# Patient Record
Sex: FEMALE | Race: OTHER | NOT HISPANIC OR LATINO | URBAN - METROPOLITAN AREA
[De-identification: names, ages, dates, MRNs, and addresses within clinical notes are randomized per-mention and may not be internally consistent; named-entity substitution may affect disease eponyms.]

---

## 2017-12-13 ENCOUNTER — OUTPATIENT (OUTPATIENT)
Dept: OUTPATIENT SERVICES | Facility: HOSPITAL | Age: 27
LOS: 1 days | End: 2017-12-13
Payer: COMMERCIAL

## 2017-12-13 VITALS
RESPIRATION RATE: 16 BRPM | SYSTOLIC BLOOD PRESSURE: 130 MMHG | TEMPERATURE: 98 F | DIASTOLIC BLOOD PRESSURE: 78 MMHG | WEIGHT: 179.02 LBS | HEART RATE: 80 BPM | HEIGHT: 62 IN

## 2017-12-13 DIAGNOSIS — Z01.818 ENCOUNTER FOR OTHER PREPROCEDURAL EXAMINATION: ICD-10-CM

## 2017-12-13 DIAGNOSIS — N83.202 UNSPECIFIED OVARIAN CYST, LEFT SIDE: ICD-10-CM

## 2017-12-13 DIAGNOSIS — N83.209 UNSPECIFIED OVARIAN CYST, UNSPECIFIED SIDE: ICD-10-CM

## 2017-12-13 LAB
ANION GAP SERPL CALC-SCNC: 8 MMOL/L — SIGNIFICANT CHANGE UP (ref 5–17)
BUN SERPL-MCNC: 12 MG/DL — SIGNIFICANT CHANGE UP (ref 7–23)
CALCIUM SERPL-MCNC: 9.3 MG/DL — SIGNIFICANT CHANGE UP (ref 8.5–10.1)
CHLORIDE SERPL-SCNC: 106 MMOL/L — SIGNIFICANT CHANGE UP (ref 96–108)
CO2 SERPL-SCNC: 26 MMOL/L — SIGNIFICANT CHANGE UP (ref 22–31)
CREAT SERPL-MCNC: 0.59 MG/DL — SIGNIFICANT CHANGE UP (ref 0.5–1.3)
GLUCOSE SERPL-MCNC: 83 MG/DL — SIGNIFICANT CHANGE UP (ref 70–99)
HCG SERPL-ACNC: <1 MIU/ML — SIGNIFICANT CHANGE UP
HCT VFR BLD CALC: 44.7 % — SIGNIFICANT CHANGE UP (ref 34.5–45)
HGB BLD-MCNC: 14.4 G/DL — SIGNIFICANT CHANGE UP (ref 11.5–15.5)
HIV 1+2 AB+HIV1 P24 AG SERPL QL IA: SIGNIFICANT CHANGE UP
MCHC RBC-ENTMCNC: 29.3 PG — SIGNIFICANT CHANGE UP (ref 27–34)
MCHC RBC-ENTMCNC: 32.1 GM/DL — SIGNIFICANT CHANGE UP (ref 32–36)
MCV RBC AUTO: 91.2 FL — SIGNIFICANT CHANGE UP (ref 80–100)
PLATELET # BLD AUTO: 346 K/UL — SIGNIFICANT CHANGE UP (ref 150–400)
POTASSIUM SERPL-MCNC: 3.6 MMOL/L — SIGNIFICANT CHANGE UP (ref 3.5–5.3)
POTASSIUM SERPL-SCNC: 3.6 MMOL/L — SIGNIFICANT CHANGE UP (ref 3.5–5.3)
RBC # BLD: 4.9 M/UL — SIGNIFICANT CHANGE UP (ref 3.8–5.2)
RBC # FLD: 12 % — SIGNIFICANT CHANGE UP (ref 10.3–14.5)
SODIUM SERPL-SCNC: 140 MMOL/L — SIGNIFICANT CHANGE UP (ref 135–145)
WBC # BLD: 9.8 K/UL — SIGNIFICANT CHANGE UP (ref 3.8–10.5)
WBC # FLD AUTO: 9.8 K/UL — SIGNIFICANT CHANGE UP (ref 3.8–10.5)

## 2017-12-13 PROCEDURE — G0463: CPT

## 2017-12-13 PROCEDURE — 86850 RBC ANTIBODY SCREEN: CPT

## 2017-12-13 PROCEDURE — 87389 HIV-1 AG W/HIV-1&-2 AB AG IA: CPT

## 2017-12-13 PROCEDURE — 85027 COMPLETE CBC AUTOMATED: CPT

## 2017-12-13 PROCEDURE — 80048 BASIC METABOLIC PNL TOTAL CA: CPT

## 2017-12-13 PROCEDURE — 84702 CHORIONIC GONADOTROPIN TEST: CPT

## 2017-12-13 PROCEDURE — 86901 BLOOD TYPING SEROLOGIC RH(D): CPT

## 2017-12-13 PROCEDURE — 86900 BLOOD TYPING SEROLOGIC ABO: CPT

## 2017-12-13 PROCEDURE — 36415 COLL VENOUS BLD VENIPUNCTURE: CPT

## 2017-12-13 NOTE — H&P PST ADULT - HISTORY OF PRESENT ILLNESS
28 y/o healthy female went for regular check up with GYN doctor. Sonogram was done , diagnosed with ovarian cyst about 6 months ago. Was started on birth control pills 6 months ago, was having headaches and irregular periods, so stopped. Went back for follow up about the cyst, was told that it has grown.  Now scheduled for left  laparoscopic ovarian cystectomy. Pre op testing today.

## 2017-12-13 NOTE — H&P PST ADULT - ASSESSMENT
28 y/o healthy female with left ovarian cyst, scheduled for left  laparoscopic ovarian cystectomy. Pre op testing today. No medical eval necessary.

## 2017-12-21 ENCOUNTER — TRANSCRIPTION ENCOUNTER (OUTPATIENT)
Age: 27
End: 2017-12-21

## 2017-12-21 RX ORDER — HYDROMORPHONE HYDROCHLORIDE 2 MG/ML
0.5 INJECTION INTRAMUSCULAR; INTRAVENOUS; SUBCUTANEOUS ONCE
Qty: 0 | Refills: 0 | Status: DISCONTINUED | OUTPATIENT
Start: 2017-12-22 | End: 2017-12-22

## 2017-12-21 RX ORDER — SODIUM CHLORIDE 9 MG/ML
1000 INJECTION, SOLUTION INTRAVENOUS
Qty: 0 | Refills: 0 | Status: DISCONTINUED | OUTPATIENT
Start: 2017-12-22 | End: 2017-12-22

## 2017-12-22 ENCOUNTER — OUTPATIENT (OUTPATIENT)
Dept: OUTPATIENT SERVICES | Facility: HOSPITAL | Age: 27
LOS: 1 days | End: 2017-12-22
Payer: COMMERCIAL

## 2017-12-22 ENCOUNTER — RESULT REVIEW (OUTPATIENT)
Age: 27
End: 2017-12-22

## 2017-12-22 VITALS
WEIGHT: 181 LBS | SYSTOLIC BLOOD PRESSURE: 117 MMHG | HEART RATE: 73 BPM | TEMPERATURE: 98 F | DIASTOLIC BLOOD PRESSURE: 71 MMHG | OXYGEN SATURATION: 100 % | HEIGHT: 62 IN | RESPIRATION RATE: 14 BRPM

## 2017-12-22 VITALS
DIASTOLIC BLOOD PRESSURE: 77 MMHG | SYSTOLIC BLOOD PRESSURE: 116 MMHG | HEART RATE: 76 BPM | RESPIRATION RATE: 16 BRPM | OXYGEN SATURATION: 94 %

## 2017-12-22 DIAGNOSIS — N83.209 UNSPECIFIED OVARIAN CYST, UNSPECIFIED SIDE: ICD-10-CM

## 2017-12-22 LAB — HCG UR QL: NEGATIVE — SIGNIFICANT CHANGE UP

## 2017-12-22 PROCEDURE — 81025 URINE PREGNANCY TEST: CPT

## 2017-12-22 PROCEDURE — 58662 LAPAROSCOPY EXCISE LESIONS: CPT

## 2017-12-22 PROCEDURE — 88305 TISSUE EXAM BY PATHOLOGIST: CPT

## 2017-12-22 PROCEDURE — 88305 TISSUE EXAM BY PATHOLOGIST: CPT | Mod: 26

## 2017-12-22 PROCEDURE — C1889: CPT

## 2017-12-22 RX ORDER — ACETAMINOPHEN 500 MG
1000 TABLET ORAL ONCE
Qty: 0 | Refills: 0 | Status: COMPLETED | OUTPATIENT
Start: 2017-12-22 | End: 2017-12-22

## 2017-12-22 RX ORDER — OXYCODONE HYDROCHLORIDE 5 MG/1
10 TABLET ORAL ONCE
Qty: 0 | Refills: 0 | Status: DISCONTINUED | OUTPATIENT
Start: 2017-12-22 | End: 2017-12-22

## 2017-12-22 RX ADMIN — Medication 200 MILLIGRAM(S): at 11:54

## 2017-12-22 RX ADMIN — SODIUM CHLORIDE 100 MILLILITER(S): 9 INJECTION, SOLUTION INTRAVENOUS at 11:54

## 2017-12-22 RX ADMIN — HYDROMORPHONE HYDROCHLORIDE 0.5 MILLIGRAM(S): 2 INJECTION INTRAMUSCULAR; INTRAVENOUS; SUBCUTANEOUS at 12:05

## 2017-12-22 RX ADMIN — HYDROMORPHONE HYDROCHLORIDE 0.5 MILLIGRAM(S): 2 INJECTION INTRAMUSCULAR; INTRAVENOUS; SUBCUTANEOUS at 11:53

## 2017-12-22 RX ADMIN — SODIUM CHLORIDE 40 MILLILITER(S): 9 INJECTION, SOLUTION INTRAVENOUS at 07:46

## 2017-12-22 NOTE — ASU DISCHARGE PLAN (ADULT/PEDIATRIC). - =======================================================================
10/9/2017 10:41 AM 
 
Patient: Varsha YOB: 1952 Date of Visit:    10/9/2017 Dear MD Zulay 
 
Thank you for referring Ms. Maddox to me for evaluation/treatment. Below are the relevant portions of my assessment and plan of care. Neurology Consultation Note REFERRED BY: 
MD Zulay 
 
10/09/17 Chief Complaint Patient presents with  New Patient  
  gridele muscle weakness/elevated CK HISTORY OF PRESENT ILLNESS Varsha is a 76 y.o. female who presented to the neurology office for management of progressive weakness in proximal muscles of the lower extremities. The patient states that she used to live on the third floor around 3 years ago but started noticing some weakness 2 years ago and in the last 1 year it has gotten even more significant. It has been gradually progressing. She does not have any problems walking straight on a level floor but when she has to go up the stairs she starts having strain in her lower back and she has to pull herself up using the handrail. She notices weakness in her lower extremities as well. She denies any history of diabetes and there is no associated numbness in the lower extremities. She did have muscle enzymes and they were marginally elevated. Current Outpatient Prescriptions Medication Sig  escitalopram oxalate (LEXAPRO) 10 mg tablet Take 10 mg by mouth daily.  losartan (COZAAR) 100 mg tablet Take 100 mg by mouth daily.  valACYclovir (VALTREX) 1 gram tablet daily.  zolpidem (AMBIEN) 10 mg tablet Take 10 mg by mouth once.  estradiol (ESTRACE) 0.5 mg tablet Take  by mouth daily.  medroxyPROGESTERone (PROVERA) 2.5 mg tablet Take 2.5 mg by mouth daily.  cyanocobalamin, vitamin B-12, 3,000 mcg lozg by SubLINGual route.  cranberry extract 450 mg tab tablet Take 450 mg by mouth two (2) times a day. No current facility-administered medications for this visit. Allergies Allergen Reactions  Sulfa (Sulfonamide Antibiotics) Itching Past Medical History:  
Diagnosis Date  Herpes labialis  History of recurrent UTIs   
 sex related  Hormone replacement therapy  HTN (hypertension)  RBBB History reviewed. No pertinent surgical history. Family History Problem Relation Age of Onset  Cancer Mother   
  colon Social History Substance Use Topics  Smoking status: Never Smoker  Smokeless tobacco: Never Used  Alcohol use Yes REVIEW OF SYSTEMS:  
A ten system review of constitutional, cardiovascular, respiratory, musculoskeletal, endocrine, skin, SHEENT, genitourinary, psychiatric and neurologic systems was obtained and is unremarkable with the exception of depression, joint pain, muscle pain, muscle weakness, neuropathy, snoring and vertigo EXAMINATION:  
Visit Vitals  /80  Ht 5' 2\" (1.575 m)  Wt 144 lb 9.6 oz (65.6 kg)  SpO2 98%  BMI 26.45 kg/m2 General:  
General appearance: Pt is in no acute distress Distal pulses are preserved Fundoscopic Exam: Normal 
 
Neurological Examination:  
Mental Status: AAO x3. Speech is fluent. Follows commands, has normal fund of knowledge, attention, short term recall, comprehension and insight. Cranial Nerves: Visual fields are full. PERRL, Extraocular movements are full. Facial sensation intact V1- V3. Facial movement intact, symmetric. Hearing intact to conversation. Palate elevates symmetrically. Shoulder shrug symmetric. Tongue midline. Motor: Strength is 5/5 in all 4 ext. No atrophy. Tone: Normal 
 
Sensation: Normal to light touch Reflexes: DTRs 2+ everywhere except absent at ankles. Plantar responses downgoing. Coordination/Cerebellar: Intact to finger-nose-finger Gait: Casual gait is normal.  
 
Skin: No significant bruising or lacerations.  
 
I also examined her while going up the stairs and she had to make additional effort to go up the stairs and was holding onto her lower back. Laboratory review:  
Results for orders placed or performed in visit on 08/11/17 CK Result Value Ref Range Creatine Kinase,Total 289 (H) 24 - 173 U/L ALDOLASE Result Value Ref Range Aldolase 4.7 3.3 - 10.3 U/L  
SED RATE (ESR) Result Value Ref Range Sed rate (ESR) 3 0 - 40 mm/hr Imaging review: 
8/9/2017 Mammogram 
The left implant has new extracapsular silicone. The left implant shows extracapsular rupture. No specific mammographic evidence of malignancy. Documentation review: I reviewed the notes from the primary care physician from 8/11/2017. The patient does have hypertension. The patient is also complaining of lower extremity weakness. The patient has chronic UTIs. The patient is having trouble swallowing at times. We will do blood work and a 3D mammogram and a colonoscopy. We will start Keflex for recurrent UTI as needed. The patient is having pelvic girdle weakness and has problems going up the stairs. Will get a CK level. And will refer the patient to physical therapy. Assessment/Plan: Jaquan Moreno is a 76 y.o. female who presented to the neurology office for management of weakness in the proximal muscles of the lower extremities. On examination I did not find any appreciable weakness in the proximal lower extremities. I also took her on the stairs and she was holding onto her back to go up the stairs. She might have mild weakness that is not appreciable on formal testing. I will get MRI of the lumbar spine and an EMG/nerve conduction study to evaluate this further. Also, I have ordered myasthenia panel and will be repeating the CK level. If this workup is negative, we will proceed with proximal lower extremity muscle biopsy to look for any dystrophy/myopathy. ICD-10-CM ICD-9-CM 1. Proximal limb muscle weakness M62.89 728.87 MRI LUMB SPINE WO CONT MYASTHENIA GRAVIS EVALUATION  
   CK Thank you for allowing me to participate in the care of Ms. Weiner. Please feel free to contact me if you have any questions. Ronell Bumpers, MD 
Neurologist 
 
CC: Hussain Pereyra MD 
Fax: [phone] This note was created using voice recognition software. Despite editing, there may be syntax errors. This note will not be viewable in [address] If you have questions, please do not hesitate to call me. I look forward to following Ms. Weiner along with you. Sincerely, Ronell Bumpers, MD 
 
 
 
 Statement Selected

## 2017-12-22 NOTE — BRIEF OPERATIVE NOTE - PROCEDURE
<<-----Click on this checkbox to enter Procedure Laparoscopic left ovarian cystectomy  12/22/2017    Active  AMBER

## 2017-12-22 NOTE — ASU DISCHARGE PLAN (ADULT/PEDIATRIC). - NOTIFY
Pain not relieved by Medications/GYN Fever>100.4/Bleeding that does not stop/Fever greater than 101/Unable to Urinate/Persistent Nausea and Vomiting

## 2017-12-22 NOTE — ASU DISCHARGE PLAN (ADULT/PEDIATRIC). - INSTRUCTIONS
****Call the office with any problems including but not limited to heavy vaginal bleeding, fevers, severe abdominal pain, inability to eat/drink/urinate  **** Nothing in the vagina x2 weeks-( No sex, tampons, douching )  *****You may shower as usual but no hot tubs, bath tubs, swimming pools x2 weeks. Advance diet as tolerated

## 2017-12-22 NOTE — ASU DISCHARGE PLAN (ADULT/PEDIATRIC). - MEDICATION SUMMARY - MEDICATIONS TO TAKE
I will START or STAY ON the medications listed below when I get home from the hospital:    Percocet 5/325 oral tablet  -- 1 tab(s) by mouth every 6 hours, As Needed MDD:4   -- Caution federal law prohibits the transfer of this drug to any person other  than the person for whom it was prescribed.  May cause drowsiness.  Alcohol may intensify this effect.  Use care when operating dangerous machinery.  This prescription cannot be refilled.  This product contains acetaminophen.  Do not use  with any other product containing acetaminophen to prevent possible liver damage.  Using more of this medication than prescribed may cause serious breathing problems.    -- Indication: For pain med    Motrin 600 mg oral tablet  -- 1 tab(s) by mouth every 8 hours x 3 days   -- Indication: For pain med

## 2017-12-22 NOTE — ASU DISCHARGE PLAN (ADULT/PEDIATRIC). - ACTIVITY LEVEL
no exercise/no douching/no tampons/no sports/gym/nothing per vagina/no intercourse/no tub baths/no heavy lifting

## 2017-12-22 NOTE — BRIEF OPERATIVE NOTE - OPERATION/FINDINGS
EUA: Normal sized anteverted uterus. Left adnexal fullness. Laparoscopy: Left ovarian cyst, ruptured, hair and fat noted. Normal appearing right ovary, and bilateral tubes.

## 2020-03-09 ENCOUNTER — RESULT REVIEW (OUTPATIENT)
Age: 30
End: 2020-03-09

## 2020-03-09 ENCOUNTER — OUTPATIENT (OUTPATIENT)
Dept: OUTPATIENT SERVICES | Facility: HOSPITAL | Age: 30
LOS: 1 days | End: 2020-03-09
Payer: COMMERCIAL

## 2020-03-09 VITALS
TEMPERATURE: 98 F | OXYGEN SATURATION: 100 % | WEIGHT: 190.04 LBS | SYSTOLIC BLOOD PRESSURE: 127 MMHG | DIASTOLIC BLOOD PRESSURE: 69 MMHG | HEART RATE: 72 BPM | HEIGHT: 63 IN | RESPIRATION RATE: 14 BRPM

## 2020-03-09 DIAGNOSIS — Z01.818 ENCOUNTER FOR OTHER PREPROCEDURAL EXAMINATION: ICD-10-CM

## 2020-03-09 DIAGNOSIS — N87.1 MODERATE CERVICAL DYSPLASIA: ICD-10-CM

## 2020-03-09 DIAGNOSIS — Z98.890 OTHER SPECIFIED POSTPROCEDURAL STATES: Chronic | ICD-10-CM

## 2020-03-09 LAB
HCG SERPL-ACNC: <1 MIU/ML — SIGNIFICANT CHANGE UP
HCT VFR BLD CALC: 42.3 % — SIGNIFICANT CHANGE UP (ref 34.5–45)
HGB BLD-MCNC: 13.7 G/DL — SIGNIFICANT CHANGE UP (ref 11.5–15.5)
HIV 1+2 AB+HIV1 P24 AG SERPL QL IA: SIGNIFICANT CHANGE UP
MCHC RBC-ENTMCNC: 29 PG — SIGNIFICANT CHANGE UP (ref 27–34)
MCHC RBC-ENTMCNC: 32.4 GM/DL — SIGNIFICANT CHANGE UP (ref 32–36)
MCV RBC AUTO: 89.6 FL — SIGNIFICANT CHANGE UP (ref 80–100)
NRBC # BLD: 0 /100 WBCS — SIGNIFICANT CHANGE UP (ref 0–0)
PLATELET # BLD AUTO: 329 K/UL — SIGNIFICANT CHANGE UP (ref 150–400)
RBC # BLD: 4.72 M/UL — SIGNIFICANT CHANGE UP (ref 3.8–5.2)
RBC # FLD: 13.2 % — SIGNIFICANT CHANGE UP (ref 10.3–14.5)
WBC # BLD: 9.38 K/UL — SIGNIFICANT CHANGE UP (ref 3.8–10.5)
WBC # FLD AUTO: 9.38 K/UL — SIGNIFICANT CHANGE UP (ref 3.8–10.5)

## 2020-03-09 PROCEDURE — 85027 COMPLETE CBC AUTOMATED: CPT

## 2020-03-09 PROCEDURE — 84702 CHORIONIC GONADOTROPIN TEST: CPT

## 2020-03-09 PROCEDURE — 88321 CONSLTJ&REPRT SLD PREP ELSWR: CPT

## 2020-03-09 PROCEDURE — 36415 COLL VENOUS BLD VENIPUNCTURE: CPT

## 2020-03-09 PROCEDURE — G0463: CPT

## 2020-03-09 PROCEDURE — 87389 HIV-1 AG W/HIV-1&-2 AB AG IA: CPT

## 2020-03-09 RX ORDER — IBUPROFEN 200 MG
1 TABLET ORAL
Qty: 0 | Refills: 0 | DISCHARGE

## 2020-03-09 NOTE — H&P PST ADULT - NSANTHOSAYNRD_GEN_A_CORE
No. ARLEN screening performed.  STOP BANG Legend: 0-2 = LOW Risk; 3-4 = INTERMEDIATE Risk; 5-8 = HIGH Risk

## 2020-03-09 NOTE — H&P PST ADULT - NSICDXPROBLEM_GEN_ALL_CORE_FT
PROBLEM DIAGNOSES  Problem: Moderate cervical dysplasia  Assessment and Plan: PST Labs; CBC,  HcG - No medical Clearance needed - pt instructed to stop any NSAIDS/Herbal Supplements her Probiotic between now and procedure - pt may take Tylenol if needed for pain between now and procedure - pre-op instructions given to pt with understanding verbalized

## 2020-03-09 NOTE — H&P PST ADULT - NSICDXFAMILYHX_GEN_ALL_CORE_FT
FAMILY HISTORY:  Mother  Still living? Yes, Estimated age: 41-50  Hypertension, Age at diagnosis: Age Unknown

## 2020-03-09 NOTE — H&P PST ADULT - NEGATIVE ENMT SYMPTOMS
no vertigo/no sinus symptoms/no throat pain/no dysphagia/no hearing difficulty/no nasal congestion/no tinnitus/no post-nasal discharge

## 2020-03-09 NOTE — H&P PST ADULT - NSICDXPASTSURGICALHX_GEN_ALL_CORE_FT
PAST SURGICAL HISTORY:  H/O eye surgery RIGHT Eye Surgery August 2019    H/O ovarian cystectomy 2017

## 2020-03-09 NOTE — H&P PST ADULT - ASSESSMENT
29 year old female with Moderate Cervical Dysplasia - Scheduled for LEEP procedure - Endocervical Curettage on 3/13/2020 with Dr Aguero-

## 2020-03-09 NOTE — H&P PST ADULT - NSICDXPASTMEDICALHX_GEN_ALL_CORE_FT
PAST MEDICAL HISTORY:  History of abnormal Pap smear     History of HPV infection     History of ovarian cyst     Moderate cervical dysplasia

## 2020-03-09 NOTE — H&P PST ADULT - ENMT COMMENTS
Notes "Occasional Ear -Popping" - notes has been checked by ENT and they said " nothing to worry about - got hearing checked and it was fine"

## 2020-03-09 NOTE — H&P PST ADULT - HISTORY OF PRESENT ILLNESS
29 year old female G0 Presents for PST prior to  LEEP procedure - Endocervical Curettage with Dr Aguero on 3//13/2020 - Pt notes prior H/O Abnormal Pap Smear however notes "there has been a change with most recent one." Pt notes following discussions with Dr Aguero she is electing for scheduled procedure.

## 2020-03-12 ENCOUNTER — TRANSCRIPTION ENCOUNTER (OUTPATIENT)
Age: 30
End: 2020-03-12

## 2020-03-12 RX ORDER — OXYCODONE HYDROCHLORIDE 5 MG/1
5 TABLET ORAL ONCE
Refills: 0 | Status: DISCONTINUED | OUTPATIENT
Start: 2020-03-13 | End: 2020-03-13

## 2020-03-12 RX ORDER — METOCLOPRAMIDE HCL 10 MG
5 TABLET ORAL ONCE
Refills: 0 | Status: DISCONTINUED | OUTPATIENT
Start: 2020-03-13 | End: 2020-03-28

## 2020-03-12 RX ORDER — ACETAMINOPHEN 500 MG
975 TABLET ORAL ONCE
Refills: 0 | Status: COMPLETED | OUTPATIENT
Start: 2020-03-13 | End: 2020-03-13

## 2020-03-12 RX ORDER — SODIUM CHLORIDE 9 MG/ML
1000 INJECTION, SOLUTION INTRAVENOUS
Refills: 0 | Status: DISCONTINUED | OUTPATIENT
Start: 2020-03-13 | End: 2020-03-13

## 2020-03-12 RX ORDER — HYDROMORPHONE HYDROCHLORIDE 2 MG/ML
0.5 INJECTION INTRAMUSCULAR; INTRAVENOUS; SUBCUTANEOUS
Refills: 0 | Status: DISCONTINUED | OUTPATIENT
Start: 2020-03-13 | End: 2020-03-13

## 2020-03-12 RX ORDER — SODIUM CHLORIDE 9 MG/ML
1000 INJECTION, SOLUTION INTRAVENOUS
Refills: 0 | Status: DISCONTINUED | OUTPATIENT
Start: 2020-03-13 | End: 2020-03-28

## 2020-03-12 NOTE — ASU PATIENT PROFILE, ADULT - PMH
History of abnormal Pap smear    History of HPV infection    History of ovarian cyst    Moderate cervical dysplasia

## 2020-03-13 ENCOUNTER — OUTPATIENT (OUTPATIENT)
Dept: OUTPATIENT SERVICES | Facility: HOSPITAL | Age: 30
LOS: 1 days | End: 2020-03-13
Payer: COMMERCIAL

## 2020-03-13 ENCOUNTER — RESULT REVIEW (OUTPATIENT)
Age: 30
End: 2020-03-13

## 2020-03-13 VITALS
RESPIRATION RATE: 16 BRPM | WEIGHT: 188.05 LBS | HEIGHT: 63 IN | SYSTOLIC BLOOD PRESSURE: 108 MMHG | HEART RATE: 73 BPM | TEMPERATURE: 98 F | DIASTOLIC BLOOD PRESSURE: 69 MMHG | OXYGEN SATURATION: 99 %

## 2020-03-13 VITALS
SYSTOLIC BLOOD PRESSURE: 116 MMHG | DIASTOLIC BLOOD PRESSURE: 76 MMHG | HEART RATE: 79 BPM | RESPIRATION RATE: 15 BRPM | OXYGEN SATURATION: 99 %

## 2020-03-13 DIAGNOSIS — N87.1 MODERATE CERVICAL DYSPLASIA: ICD-10-CM

## 2020-03-13 DIAGNOSIS — Z01.818 ENCOUNTER FOR OTHER PREPROCEDURAL EXAMINATION: ICD-10-CM

## 2020-03-13 DIAGNOSIS — Z98.890 OTHER SPECIFIED POSTPROCEDURAL STATES: Chronic | ICD-10-CM

## 2020-03-13 PROCEDURE — 88305 TISSUE EXAM BY PATHOLOGIST: CPT

## 2020-03-13 PROCEDURE — 88305 TISSUE EXAM BY PATHOLOGIST: CPT | Mod: 26

## 2020-03-13 PROCEDURE — 88307 TISSUE EXAM BY PATHOLOGIST: CPT | Mod: 26

## 2020-03-13 PROCEDURE — 57522 CONIZATION OF CERVIX: CPT

## 2020-03-13 PROCEDURE — 88307 TISSUE EXAM BY PATHOLOGIST: CPT

## 2020-03-13 RX ADMIN — SODIUM CHLORIDE 75 MILLILITER(S): 9 INJECTION, SOLUTION INTRAVENOUS at 14:04

## 2020-03-13 RX ADMIN — Medication 975 MILLIGRAM(S): at 14:04

## 2020-03-13 RX ADMIN — SODIUM CHLORIDE 120 MILLILITER(S): 9 INJECTION, SOLUTION INTRAVENOUS at 16:46

## 2020-03-13 NOTE — BRIEF OPERATIVE NOTE - NSICDXBRIEFPROCEDURE_GEN_ALL_CORE_FT
PROCEDURES:  Endocervical curettage 13-Mar-2020 16:27:03  Mary Beth Cesar  LEEP, cervix 13-Mar-2020 16:26:50  Mary Beth Cesar

## 2020-03-13 NOTE — ASU DISCHARGE PLAN (ADULT/PEDIATRIC) - FOLLOW UP APPOINTMENTS
911 or go to the nearest Emergency Room 528 or go to the nearest Emergency Room/[facility] [phone]

## 2020-03-13 NOTE — ASU DISCHARGE PLAN (ADULT/PEDIATRIC) - CALL YOUR DOCTOR IF YOU HAVE ANY OF THE FOLLOWING:
Nausea and vomiting that does not stop/Pain not relieved by Medications/Bleeding that does not stop/Wound/Surgical Site with redness, or foul smelling discharge or pus/Unable to urinate/Numbness, tingling, color or temperature change to extremity/Fever greater than (need to indicate Fahrenheit or Celsius)/Swelling that gets worse

## 2020-03-13 NOTE — ASU DISCHARGE PLAN (ADULT/PEDIATRIC) - ACTIVITY LEVEL
No tub baths/No heavy lifting/No tampons/No douching/No sports/gym/Quiet play/Nothing per rectum/Nothing per vagina/No intercourse

## 2020-03-13 NOTE — ASU DISCHARGE PLAN (ADULT/PEDIATRIC) - CARE PROVIDER_API CALL
Mary Beth Cleveland)  Obstetrics and Gynecology  [address] [facility], [address] [place] [address]  Phone: [phone]  Fax: [phone]  Established Patient  Follow Up Time:

## 2020-10-14 PROBLEM — N87.1 MODERATE CERVICAL DYSPLASIA: Chronic | Status: ACTIVE | Noted: 2020-03-09

## 2020-10-14 PROBLEM — Z87.42 PERSONAL HISTORY OF OTHER DISEASES OF THE FEMALE GENITAL TRACT: Chronic | Status: ACTIVE | Noted: 2020-03-09

## 2020-10-19 NOTE — ASU DISCHARGE PLAN (ADULT/PEDIATRIC). - DIET
"  SUBJECTIVE:       HPI: Leah Cox is a 30 year old  who presents today for consultation for AUB, referral from [facility] Rosas. Long history irregular menstrual cycles, would like to go back on birth control. LMP 9. Has previously been seen for this and work-up has included basic labs and imaging. Her menses have previously been regular while taking OCPs in her 20s. Menses are every 2 weeks to 2 months. Menses are light and heavy, depending on cycle. Has seen large clots with menses. Menses last about 14 days. Has a \"mustache\" that she shaves. No excessive acne. Has previously been told she may have an \"ovarian syndrome.\"    Considering pregnancy in the next 1-5 years but not ready yet.    Last pelvic ultrasound  and was normal. No ovarian cysts.  Labs 2019:  FSH 8.9  LH 12.2  Prolactin 6    Medical history significant for hypothyroidism on Synthroid, last TSH 3.72; tobacco use, depression    She denies vaginal discharge, dysmenorrhea, dyspareunia. She denies fevers/chills, nausea/vomiting, abdominal pain or bloating. Denies dysuria, hematuria, constipation or diarrhea.    No history HTN. History migraines without aura.    Ob Hx:     Gyn Hx: Patient's last menstrual period was 2020 (exact date).    Patient is sexually active. One male partner   Last pap was  NIL neg HPV, history LEEP  for CINII.  Next pap due    STI history denies  Using none for contraception.   STD testing offered?  Declined  Menarche 14 years old.   Family history of gyn-related malignancies: Mother had benign ovarian cysts and breast cancer x2 (dx age 19yo and 24yo). No genetic testing done.         reports that she has been smoking cigarettes. She has been smoking about 0.25 packs per day. She has never used smokeless tobacco.    Today's PHQ-2 Score:   PHQ-2 (  [employer]) 10/9/2020   Q1: Little interest or pleasure in doing things 1   Q2: Feeling down, depressed or hopeless 3   PHQ-2 Score 4 "   Q1: Little interest or pleasure in doing things Several days   Q2: Feeling down, depressed or hopeless Nearly every day   PHQ-2 Score 4     Today's PHQ-9 Score:   PHQ-9 SCORE 10/9/2020   PHQ-9 Total Score MyChart 11 (Moderate depression)   PHQ-9 Total Score 11     Today's RENAY-7 Score:   RENAY-7 SCORE 11/13/2018   Total Score 20       Problem list and histories reviewed & adjusted, as indicated.  Additional history: as documented.    Patient Active Problem List   Diagnosis     Irregular periods     Pelvic pain in female     Carpal tunnel syndrome of right wrist     Adjustment disorder with depressed mood     Excessive or frequent menstruation     Ganglion cyst of dorsum of right wrist     Metacarpal boss     Segmental dysfunction of sacral region     Bilateral low back pain with right-sided sciatica     Segmental dysfunction of lumbar region     Segmental dysfunction of thoracic region     S/P LEEP of cervix     Tobacco use disorder     Major depressive disorder, recurrent episode, moderate (H)     RENAY (generalized anxiety disorder)     Diarrhea, unspecified type     Abdominal pain, generalized     Hypothyroidism, unspecified type     Family dysfunction     Past Surgical History:   Procedure Laterality Date     LEEP TX, CERVICAL  01/23/2013    SUZAN 2 with free margins - Care Everywhere.     RELEASE CARPAL TUNNEL Right 10/5/2016    Procedure: RELEASE CARPAL TUNNEL;  Surgeon: Christian Sebastian MD;  Location:  OR      Social History     Tobacco Use     Smoking status: Current Every Day Smoker     Packs/day: 0.25     Types: Cigarettes     Smokeless tobacco: Never Used   Substance Use Topics     Alcohol use: Yes     Alcohol/week: 0.0 standard drinks     Comment: occ.      Problem (# of Occurrences) Relation (Name,Age of Onset)    Hypertension (1) Father                 FLUoxetine (PROZAC) 10 MG capsule, Take 1 capsule (10 mg) by mouth daily       ibuprofen (ADVIL/MOTRIN) 200 MG capsule, Take 400 mg by mouth every 4  hours as needed for fever       levothyroxine (SYNTHROID/LEVOTHROID) 100 MCG tablet, TAKE ONE TABLET BY MOUTH ONCE DAILY       tiZANidine (ZANAFLEX) 4 MG capsule, Take 1 capsule (4 mg) by mouth At Bedtime (Patient not taking: Reported on 10/19/2020)       varenicline (CHANTIX VALENTINA) 0.5 MG X 11 & 1 MG X 42 tablet, Take 0.5 mg tab daily for 3 days, THEN 0.5 mg tab twice daily for 4 days, THEN 1 mg twice daily. (Patient not taking: Reported on 10/19/2020)    No current facility-administered medications on file prior to visit.     Allergies   Allergen Reactions     Cyclobenzaprine      Started giving her dreams       ROS:  10 Point review of systems negative other noted above in HPI    OBJECTIVE:     /78   Pulse 84   Temp 97.4  F (36.3  C) (Temporal)   Wt 85.5 kg (188 lb 8 oz)   LMP 09/05/2020 (Exact Date)   BMI 33.39 kg/m    Body mass index is 33.39 kg/m .    GENERAL: healthy, alert and no distress  EYES: Eyes grossly normal to inspection, conjunctivae and sclerae normal  RESP: no audible wheeze, cough, or visible cyanosis.  No visible retractions or increased work of breathing.  Able to speak fully in complete sentences.  Neck: soft, no cervical adenopathy, no masses  CV: RRR, no murmurs, no extra heart sounds, 2+ peripheral pulses  Resp: CTAB, good effort without distress   NEURO: Cranial nerves grossly intact, mentation intact and speech normal  PSYCH: mentation appears normal, affect normal/bright, judgement and insight intact, normal speech and appearance well-groomed        In-Clinic Test Results:  Results for orders placed or performed in visit on 10/19/20 (from the past 24 hour(s))   Hemoglobin A1c   Result Value Ref Range    Hemoglobin A1C 4.9 0 - 5.6 %   TSH with free T4 reflex   Result Value Ref Range    TSH 3.06 0.40 - 4.00 mU/L   Lipid panel reflex to direct LDL Non-fasting   Result Value Ref Range    Cholesterol 216 (H) <200 mg/dL    Triglycerides 289 (H) <150 mg/dL    HDL Cholesterol 48 (L) >49  mg/dL    LDL Cholesterol Calculated 110 (H) <100 mg/dL    Non HDL Cholesterol 168 (H) <130 mg/dL       ASSESSMENT/PLAN:                                                      Leah Cox is a 30 year old  who presents today for consultation for AUB, referral from [facility] Ros[facility].      ICD-10-CM    1. Abnormal uterine bleeding  N93.9 TSH with free T4 reflex     norgestimate-ethinyl estradiol (ORTHO-CYCLEN) 0.25-35 MG-MCG tablet   2. Family history of malignant neoplasm of breast  Z80.3 CANCER RISK MGMT/CANCER GENETIC COUNSELING REFERRAL   3. Anovulation  N97.0 Hemoglobin A1c     Lipid panel reflex to direct LDL Non-fasting     CANCELED: Lipid panel reflex to direct LDL Fasting   4. Class 1 obesity due to excess calories without serious comorbidity with body mass index (BMI) of 33.0 to 33.9 in adult  E66.09 COMPREHENSIVE WEIGHT MANAGEMENT    Z68.33    5. Hypothyroidism, unspecified type  E03.9 TSH with free T4 reflex     AUB, likely ovulatory and diagnosis consistent with PCOS without full PCOS work-up. Symptoms and cycles previously controlled with OCPs with no significant SE. Alternative options for contraception and hormonal suppression discussed in detail, patient desires OCPs until desiring pregnancy.    Discussed at length the diagnosis and treatment plans for polycystic ovary syndrome.  Discussed PCOS as a syndrome that originates with increased insulin resistance which leads to hyperandrogenism, irregular menstruation, and polycystic appearing ovaries and impaired ovulation.  Discussed lifelong implications such as endometrial hyperplasia, lipid abnormalities, diabetes, and increased risk of metabolic syndrome.      Discussed treatment plan depend on patient's desire to conceive.  If no desire to conceive, then recommended hormonal contraception.  If desires to conceive, then recommended weight loss, regular menstruation with progestins, and possible use of metformin as insulin sensitizing  agent.  Discussed possibility of using clomid or Letrozol if difficulty conceiving, but warned of increased risk of multiple gestation. Offered to complete work-up for PCOS today versus in the future along with fertility testing. She declined testing today, requesting birth control.    Patient not yet ready for pregnancy and desired to restart OCPs. Reviewed potential side effects of OCP's including but not limited to thromboembolic events, hypertension, breakthrough bleeding, GI upset, and headaches.  Reviewed proper usage.  Reviewed use of back up contraception.     Family history breast cancer, genetic counseling referral placed today.    Class 1 obesity in the setting of PCOS. Patient struggles with weight loss and was open to referral to weight loss clinic. Referral placed.     Follow-up in 1 year, sooner if needed or desiring pregnancy.      Olivia Henry DO  [facility]   progress slowly

## 2020-10-21 ENCOUNTER — OUTPATIENT (OUTPATIENT)
Dept: OUTPATIENT SERVICES | Facility: HOSPITAL | Age: 30
LOS: 1 days | End: 2020-10-21
Payer: COMMERCIAL

## 2020-10-21 VITALS
HEART RATE: 67 BPM | WEIGHT: 192.02 LBS | HEIGHT: 63 IN | RESPIRATION RATE: 16 BRPM | OXYGEN SATURATION: 100 % | DIASTOLIC BLOOD PRESSURE: 76 MMHG | TEMPERATURE: 98 F | SYSTOLIC BLOOD PRESSURE: 109 MMHG

## 2020-10-21 DIAGNOSIS — Z98.890 OTHER SPECIFIED POSTPROCEDURAL STATES: Chronic | ICD-10-CM

## 2020-10-21 DIAGNOSIS — N84.0 POLYP OF CORPUS UTERI: ICD-10-CM

## 2020-10-21 DIAGNOSIS — Z01.818 ENCOUNTER FOR OTHER PREPROCEDURAL EXAMINATION: ICD-10-CM

## 2020-10-21 DIAGNOSIS — N83.209 UNSPECIFIED OVARIAN CYST, UNSPECIFIED SIDE: ICD-10-CM

## 2020-10-21 LAB
ALBUMIN SERPL ELPH-MCNC: 3.8 G/DL — SIGNIFICANT CHANGE UP (ref 3.3–5)
ALP SERPL-CCNC: 77 U/L — SIGNIFICANT CHANGE UP (ref 40–120)
ALT FLD-CCNC: 26 U/L — SIGNIFICANT CHANGE UP (ref 12–78)
ANION GAP SERPL CALC-SCNC: 6 MMOL/L — SIGNIFICANT CHANGE UP (ref 5–17)
AST SERPL-CCNC: 11 U/L — LOW (ref 15–37)
BILIRUB SERPL-MCNC: 0.3 MG/DL — SIGNIFICANT CHANGE UP (ref 0.2–1.2)
BUN SERPL-MCNC: 14 MG/DL — SIGNIFICANT CHANGE UP (ref 7–23)
CALCIUM SERPL-MCNC: 9.1 MG/DL — SIGNIFICANT CHANGE UP (ref 8.5–10.1)
CHLORIDE SERPL-SCNC: 109 MMOL/L — HIGH (ref 96–108)
CO2 SERPL-SCNC: 25 MMOL/L — SIGNIFICANT CHANGE UP (ref 22–31)
CREAT SERPL-MCNC: 0.67 MG/DL — SIGNIFICANT CHANGE UP (ref 0.5–1.3)
GLUCOSE SERPL-MCNC: 99 MG/DL — SIGNIFICANT CHANGE UP (ref 70–99)
HCG SERPL-ACNC: <1 MIU/ML — SIGNIFICANT CHANGE UP
HCT VFR BLD CALC: 38.6 % — SIGNIFICANT CHANGE UP (ref 34.5–45)
HGB BLD-MCNC: 13.4 G/DL — SIGNIFICANT CHANGE UP (ref 11.5–15.5)
MCHC RBC-ENTMCNC: 30.1 PG — SIGNIFICANT CHANGE UP (ref 27–34)
MCHC RBC-ENTMCNC: 34.7 GM/DL — SIGNIFICANT CHANGE UP (ref 32–36)
MCV RBC AUTO: 86.7 FL — SIGNIFICANT CHANGE UP (ref 80–100)
NRBC # BLD: 0 /100 WBCS — SIGNIFICANT CHANGE UP (ref 0–0)
PLATELET # BLD AUTO: 322 K/UL — SIGNIFICANT CHANGE UP (ref 150–400)
POTASSIUM SERPL-MCNC: 3.9 MMOL/L — SIGNIFICANT CHANGE UP (ref 3.5–5.3)
POTASSIUM SERPL-SCNC: 3.9 MMOL/L — SIGNIFICANT CHANGE UP (ref 3.5–5.3)
PROT SERPL-MCNC: 8 G/DL — SIGNIFICANT CHANGE UP (ref 6–8.3)
RBC # BLD: 4.45 M/UL — SIGNIFICANT CHANGE UP (ref 3.8–5.2)
RBC # FLD: 13.5 % — SIGNIFICANT CHANGE UP (ref 10.3–14.5)
SODIUM SERPL-SCNC: 140 MMOL/L — SIGNIFICANT CHANGE UP (ref 135–145)
WBC # BLD: 12.11 K/UL — HIGH (ref 3.8–10.5)
WBC # FLD AUTO: 12.11 K/UL — HIGH (ref 3.8–10.5)

## 2020-10-21 PROCEDURE — 86900 BLOOD TYPING SEROLOGIC ABO: CPT

## 2020-10-21 PROCEDURE — 80053 COMPREHEN METABOLIC PANEL: CPT

## 2020-10-21 PROCEDURE — G0463: CPT

## 2020-10-21 PROCEDURE — 85027 COMPLETE CBC AUTOMATED: CPT

## 2020-10-21 PROCEDURE — 86901 BLOOD TYPING SEROLOGIC RH(D): CPT

## 2020-10-21 PROCEDURE — 86850 RBC ANTIBODY SCREEN: CPT

## 2020-10-21 PROCEDURE — 84702 CHORIONIC GONADOTROPIN TEST: CPT

## 2020-10-21 PROCEDURE — 36415 COLL VENOUS BLD VENIPUNCTURE: CPT

## 2020-10-21 RX ORDER — L.ACIDOPH/B.ANIMALIS/B.LONGUM 15B CELL
1 CAPSULE ORAL
Qty: 0 | Refills: 0 | DISCHARGE

## 2020-10-21 RX ORDER — ASCORBIC ACID 60 MG
1 TABLET,CHEWABLE ORAL
Qty: 0 | Refills: 0 | DISCHARGE

## 2020-10-21 RX ORDER — METRONIDAZOLE 500 MG
0 TABLET ORAL
Qty: 0 | Refills: 0 | DISCHARGE

## 2020-10-21 NOTE — H&P PST ADULT - ASSESSMENT
31 yo female scheduled for Dilation and Curettage Hysteroscopy -Polypectomy-Laparoscopic right ovarian Cystectemy on 10/30/20 with Dr Aguero.

## 2020-10-21 NOTE — H&P PST ADULT - HISTORY OF PRESENT ILLNESS
31 yo female scheduled for Dilation and Curettage Hysteroscopy -Polypectomy-Laparoscopic right ovarian Cystectemy on 10/30/20 with Dr Aguero.  Patient states she went for routine check up, had an ultrasound and was found to have a right ovarian cyst that had increased in size over the past 6 month.  Patient denies any pain or irregular periods

## 2020-10-21 NOTE — H&P PST ADULT - NSICDXPROBLEM_GEN_ALL_CORE_FT
PROBLEM DIAGNOSES  Problem: Polyp of corpus uteri  Assessment and Plan: check labs cbc cmp type and screen hcg   no medical clearance  preop instructions were given  patient was given written and verbal instructions to have covid test done within  72 hours before surgery and shpuld socially isolate after test  Hibiclens x3 with written and verbal instructions given  patient verbalizes understanding of instruction

## 2020-10-21 NOTE — H&P PST ADULT - NSICDXFAMILYHX_GEN_ALL_CORE_FT
FAMILY HISTORY:  FH: type 2 diabetes, mother borderline    Mother  Still living? Yes, Estimated age: 41-50  Hypertension, Age at diagnosis: Age Unknown

## 2020-10-21 NOTE — H&P PST ADULT - NSICDXPASTSURGICALHX_GEN_ALL_CORE_FT
PAST SURGICAL HISTORY:  H/O eye surgery RIGHT Eye Surgery August 2019    H/O LEEP 3/20    H/O ovarian cystectomy 2017

## 2020-10-21 NOTE — H&P PST ADULT - NSICDXPASTMEDICALHX_GEN_ALL_CORE_FT
PAST MEDICAL HISTORY:  History of abnormal Pap smear 2019    History of HPV infection 2019    History of ovarian cyst     Moderate cervical dysplasia     Polyp of corpus uteri

## 2020-10-23 PROBLEM — Z87.898 PERSONAL HISTORY OF OTHER SPECIFIED CONDITIONS: Chronic | Status: ACTIVE | Noted: 2020-03-09

## 2020-10-23 PROBLEM — Z86.19 PERSONAL HISTORY OF OTHER INFECTIOUS AND PARASITIC DISEASES: Chronic | Status: ACTIVE | Noted: 2020-03-09

## 2020-10-23 PROBLEM — N84.0 POLYP OF CORPUS UTERI: Chronic | Status: ACTIVE | Noted: 2020-10-21

## 2020-10-27 ENCOUNTER — APPOINTMENT (OUTPATIENT)
Dept: DISASTER EMERGENCY | Facility: CLINIC | Age: 30
End: 2020-10-27

## 2020-10-29 LAB — SARS-COV-2 N GENE NPH QL NAA+PROBE: NOT DETECTED

## 2021-01-25 ENCOUNTER — APPOINTMENT (OUTPATIENT)
Dept: ULTRASOUND IMAGING | Facility: HOSPITAL | Age: 31
End: 2021-01-25

## 2021-01-25 ENCOUNTER — OUTPATIENT (OUTPATIENT)
Dept: OUTPATIENT SERVICES | Facility: HOSPITAL | Age: 31
LOS: 1 days | End: 2021-01-25
Payer: COMMERCIAL

## 2021-01-25 ENCOUNTER — APPOINTMENT (OUTPATIENT)
Dept: GYNECOLOGIC ONCOLOGY | Facility: CLINIC | Age: 31
End: 2021-01-25
Payer: COMMERCIAL

## 2021-01-25 VITALS
BODY MASS INDEX: 33.49 KG/M2 | SYSTOLIC BLOOD PRESSURE: 120 MMHG | DIASTOLIC BLOOD PRESSURE: 82 MMHG | WEIGHT: 189 LBS | HEART RATE: 74 BPM | HEIGHT: 63 IN | OXYGEN SATURATION: 98 % | TEMPERATURE: 98.4 F

## 2021-01-25 DIAGNOSIS — Z98.890 OTHER SPECIFIED POSTPROCEDURAL STATES: Chronic | ICD-10-CM

## 2021-01-25 DIAGNOSIS — N83.299 OTHER OVARIAN CYST, UNSPECIFIED SIDE: ICD-10-CM

## 2021-01-25 PROCEDURE — 76856 US EXAM PELVIC COMPLETE: CPT

## 2021-01-25 PROCEDURE — 99245 OFF/OP CONSLTJ NEW/EST HI 55: CPT

## 2021-01-25 PROCEDURE — 76830 TRANSVAGINAL US NON-OB: CPT | Mod: 26

## 2021-01-25 PROCEDURE — 99072 ADDL SUPL MATRL&STAF TM PHE: CPT

## 2021-01-25 PROCEDURE — 76856 US EXAM PELVIC COMPLETE: CPT | Mod: 26

## 2021-01-25 PROCEDURE — 76830 TRANSVAGINAL US NON-OB: CPT

## 2021-02-10 ENCOUNTER — APPOINTMENT (OUTPATIENT)
Dept: GYNECOLOGIC ONCOLOGY | Facility: CLINIC | Age: 31
End: 2021-02-10
Payer: COMMERCIAL

## 2021-02-10 PROCEDURE — 99213 OFFICE O/P EST LOW 20 MIN: CPT | Mod: 95

## 2021-03-01 ENCOUNTER — APPOINTMENT (OUTPATIENT)
Dept: GYNECOLOGIC ONCOLOGY | Facility: CLINIC | Age: 31
End: 2021-03-01
Payer: COMMERCIAL

## 2021-03-01 VITALS
TEMPERATURE: 97 F | HEART RATE: 82 BPM | WEIGHT: 188 LBS | OXYGEN SATURATION: 100 % | HEIGHT: 63 IN | DIASTOLIC BLOOD PRESSURE: 77 MMHG | SYSTOLIC BLOOD PRESSURE: 114 MMHG | BODY MASS INDEX: 33.31 KG/M2

## 2021-03-01 PROCEDURE — 99072 ADDL SUPL MATRL&STAF TM PHE: CPT

## 2021-03-01 PROCEDURE — 99213 OFFICE O/P EST LOW 20 MIN: CPT | Mod: 25

## 2021-03-01 PROCEDURE — 58100 BIOPSY OF UTERUS LINING: CPT

## 2021-03-04 LAB — CORE LAB BIOPSY: NORMAL

## 2021-04-09 ENCOUNTER — NON-APPOINTMENT (OUTPATIENT)
Age: 31
End: 2021-04-09

## 2021-05-27 ENCOUNTER — APPOINTMENT (OUTPATIENT)
Dept: ULTRASOUND IMAGING | Facility: HOSPITAL | Age: 31
End: 2021-05-27
Payer: COMMERCIAL

## 2021-06-16 ENCOUNTER — APPOINTMENT (OUTPATIENT)
Dept: ULTRASOUND IMAGING | Facility: HOSPITAL | Age: 31
End: 2021-06-16

## 2021-06-16 ENCOUNTER — OUTPATIENT (OUTPATIENT)
Dept: OUTPATIENT SERVICES | Facility: HOSPITAL | Age: 31
LOS: 1 days | End: 2021-06-16
Payer: COMMERCIAL

## 2021-06-16 DIAGNOSIS — N83.299 OTHER OVARIAN CYST, UNSPECIFIED SIDE: ICD-10-CM

## 2021-06-16 DIAGNOSIS — Z98.890 OTHER SPECIFIED POSTPROCEDURAL STATES: Chronic | ICD-10-CM

## 2021-06-16 PROCEDURE — 76830 TRANSVAGINAL US NON-OB: CPT | Mod: 26

## 2021-06-16 PROCEDURE — 76856 US EXAM PELVIC COMPLETE: CPT | Mod: 26

## 2021-06-16 PROCEDURE — 76856 US EXAM PELVIC COMPLETE: CPT

## 2021-06-16 PROCEDURE — 76830 TRANSVAGINAL US NON-OB: CPT

## 2021-06-19 PROBLEM — B97.7 HPV IN FEMALE: Status: ACTIVE | Noted: 2021-01-22

## 2021-06-19 PROBLEM — N83.299 OVARIAN CYST, COMPLEX: Status: ACTIVE | Noted: 2021-01-22

## 2021-06-19 PROBLEM — N84.0 ENDOMETRIAL POLYP: Status: ACTIVE | Noted: 2021-01-22

## 2021-06-19 PROBLEM — R87.613 HSIL (HIGH GRADE SQUAMOUS INTRAEPITHELIAL LESION) ON PAP SMEAR OF CERVIX: Status: ACTIVE | Noted: 2021-01-22

## 2021-06-21 ENCOUNTER — APPOINTMENT (OUTPATIENT)
Dept: GYNECOLOGIC ONCOLOGY | Facility: CLINIC | Age: 31
End: 2021-06-21
Payer: COMMERCIAL

## 2021-06-21 VITALS
WEIGHT: 185 LBS | DIASTOLIC BLOOD PRESSURE: 78 MMHG | SYSTOLIC BLOOD PRESSURE: 114 MMHG | HEIGHT: 63 IN | TEMPERATURE: 97 F | OXYGEN SATURATION: 97 % | HEART RATE: 77 BPM | BODY MASS INDEX: 32.78 KG/M2

## 2021-06-21 DIAGNOSIS — B97.7 PAPILLOMAVIRUS AS THE CAUSE OF DISEASES CLASSIFIED ELSEWHERE: ICD-10-CM

## 2021-06-21 DIAGNOSIS — N83.299 OTHER OVARIAN CYST, UNSPECIFIED SIDE: ICD-10-CM

## 2021-06-21 DIAGNOSIS — N84.0 POLYP OF CORPUS UTERI: ICD-10-CM

## 2021-06-21 DIAGNOSIS — R87.613 HIGH GRADE SQUAMOUS INTRAEPITHELIAL LESION ON CYTOLOGIC SMEAR OF CERVIX (HGSIL): ICD-10-CM

## 2021-06-21 PROCEDURE — 99213 OFFICE O/P EST LOW 20 MIN: CPT

## 2021-06-21 PROCEDURE — 99072 ADDL SUPL MATRL&STAF TM PHE: CPT

## 2021-08-09 NOTE — H&P PST ADULT - BACK
detailed exam Non-Graft Cartilage Fenestration Text: The cartilage was fenestrated with a 2mm punch biopsy to help facilitate healing.

## 2022-01-04 ENCOUNTER — APPOINTMENT (OUTPATIENT)
Dept: INTERNAL MEDICINE | Facility: CLINIC | Age: 32
End: 2022-01-04
Payer: COMMERCIAL

## 2022-01-04 ENCOUNTER — LABORATORY RESULT (OUTPATIENT)
Age: 32
End: 2022-01-04

## 2022-01-04 VITALS
HEART RATE: 75 BPM | HEIGHT: 63 IN | SYSTOLIC BLOOD PRESSURE: 115 MMHG | BODY MASS INDEX: 35.08 KG/M2 | OXYGEN SATURATION: 98 % | WEIGHT: 198 LBS | RESPIRATION RATE: 16 BRPM | DIASTOLIC BLOOD PRESSURE: 71 MMHG | TEMPERATURE: 97.5 F

## 2022-01-04 DIAGNOSIS — Z82.49 FAMILY HISTORY OF ISCHEMIC HEART DISEASE AND OTHER DISEASES OF THE CIRCULATORY SYSTEM: ICD-10-CM

## 2022-01-04 DIAGNOSIS — Z72.3 LACK OF PHYSICAL EXERCISE: ICD-10-CM

## 2022-01-04 DIAGNOSIS — Z78.9 OTHER SPECIFIED HEALTH STATUS: ICD-10-CM

## 2022-01-04 DIAGNOSIS — Z09 ENCOUNTER FOR FOLLOW-UP EXAMINATION AFTER COMPLETED TREATMENT FOR CONDITIONS OTHER THAN MALIGNANT NEOPLASM: ICD-10-CM

## 2022-01-04 DIAGNOSIS — Z01.818 ENCOUNTER FOR OTHER PREPROCEDURAL EXAMINATION: ICD-10-CM

## 2022-01-04 DIAGNOSIS — Z00.00 ENCOUNTER FOR GENERAL ADULT MEDICAL EXAMINATION W/OUT ABNORMAL FINDINGS: ICD-10-CM

## 2022-01-04 PROCEDURE — 99385 PREV VISIT NEW AGE 18-39: CPT | Mod: 25

## 2022-01-04 PROCEDURE — 99203 OFFICE O/P NEW LOW 30 MIN: CPT | Mod: 25

## 2022-01-04 RX ORDER — NEOMYCIN AND POLYMYXIN B SULFATES AND DEXAMETHASONE 3.5; 10000; 1 MG/G; [IU]/G; MG/G
3.5-10000-0.1 OINTMENT OPHTHALMIC
Qty: 3 | Refills: 0 | Status: COMPLETED | COMMUNITY
Start: 2021-02-24 | End: 2022-01-04

## 2022-01-04 RX ORDER — IMIQUIMOD 50 MG/G
5 CREAM TOPICAL
Qty: 12 | Refills: 0 | Status: COMPLETED | COMMUNITY
Start: 2021-02-18 | End: 2022-01-04

## 2022-01-04 RX ORDER — OMEPRAZOLE 40 MG/1
40 CAPSULE, DELAYED RELEASE ORAL
Qty: 30 | Refills: 0 | Status: COMPLETED | COMMUNITY
Start: 2021-01-13 | End: 2022-01-04

## 2022-01-04 NOTE — ASSESSMENT
[FreeTextEntry1] : 1 health  she  will check for dtap vaccine but thinks she had it in 2016    she is up to date with eye dental gyn exams  \par 2 bmi 34  pt should eat 60-70  gms of protein a day or 20-30 gms per meal This is fish chicken eggs lean meat such as chicken turkey pork and beef .  - Pt should not eat more than a 200 calorie snack  and make sure they are protein and fiber rich. she should eat 7 serving of protein, 12 servings of carbohydrates and 3 servings of non starchy vegetables and 4 servings of fat Dont eat unless you are physically hungry and never eat in front of a TV go to the kitchen table.  she should not eat more than a 1500 calories per day.\par  risks of obesity and need for diet and eating healthy Obesity is a complex disorder involving an excessive amount of body fat. Obesity isn't just a cosmetic concern. It increases your risk of diseases and health problems, such as heart disease, diabetes and high blood pressure.\par \par Being extremely obese means you are especially likely to have health problems related to your weight.\par \par \par The good news is that even modest weight loss can improve or prevent the health problems associated with obesity. Dietary changes, increased physical activity and behavior changes can help you lose weight. Prescription medications and weight-loss surgery are additional options for treating obesity.\par \par \par \par \par Symptoms\par \par Obesity is diagnosed when your body mass index (BMI) is 30 or higher. Your body mass index is calculated by dividing your weight in kilograms (kg) by your height in meters (m) squared. \par \par \par BMI\par \par Weight status\par \par \par Below 18.5 Underweight \par 18.5-24.9 Normal \par 25.0-29.9 Overweight \par 30.0-34.9 Obese (Class I) \par 35.0-39.9 Obese (Class II) \par 40.0 and higher Extreme obesity (Class III) \par \par For most people, BMI provides a reasonable estimate of body fat. However, BMI doesn't directly measure body fat, so some people, such as muscular athletes, may have a BMI in the obese category even though they don't have excess body fat. Ask your doctor if your BMI is a problem. \par \par When to see a doctor\par \par If you think you may be obese, and especially if you're concerned about weight-related health problems, see your doctor or health care provider. You and your provider can evaluate your health risks and discuss your weight-loss options. \par \par \par \par Causes\par \par Although there are genetic, behavioral and hormonal influences on body weight, obesity occurs when you take in more calories than you burn through exercise and normal daily activities. Your body stores these excess calories as fat.\par \par Obesity can sometimes be traced to a medical cause, such as Prader-Willi syndrome, Cushing's syndrome, and other diseases and conditions. However, these disorders are rare and, in general, the principal causes of obesity are:\par •Inactivity. If you're not very active, you don't burn as many calories. With a sedentary lifestyle, you can easily take in more calories every day than you use through exercise and normal daily activities.\par •Unhealthy diet and eating habits. Weight gain is inevitable if you regularly eat more calories than you burn. And most Americans' diets are too high in calories and are full of fast food and high-calorie beverages.\par \par Risk factors\par \par Obesity usually results from a combination of causes and contributing factors, including:\par •Genetics. Your genes may affect the amount of body fat you store, and where that fat is distributed. Genetics may also play a role in how efficiently your body converts food into energy and how your body burns calories during exercise.\par •Family lifestyle. Obesity tends to run in families. If one or both of your parents are obese, your risk of being obese is increased. That's not just because of genetics. Family members tend to share similar eating and activity habits.\par •Inactivity. If you're not very active, you don't burn as many calories. With a sedentary lifestyle, you can easily take in more calories every day than you burn through exercise and routine daily activities. Having medical problems, such as arthritis, can lead to decreased activity, which contributes to weight gain.\par •Unhealthy diet. A diet that's high in calories, lacking in fruits and vegetables, full of fast food, and laden with high-calorie beverages and oversized portions contributes to weight gain.\par •Medical problems. In some people, obesity can be traced to a medical cause, such as Prader-Willi syndrome, Cushing's syndrome and other conditions. Medical problems, such as arthritis, also can lead to decreased activity, which may result in weight gain.\par •Certain medications. Some medications can lead to weight gain if you don't compensate through diet or activity. These medications include some antidepressants, anti-seizure medications, diabetes medications, antipsychotic medications, steroids and beta blockers.\par •Social and economic issues. Research has linked social and economic factors to obesity. Avoiding obesity is difficult if you don't have safe areas to exercise. Similarly, you may not have been taught healthy ways of cooking, or you may not have money to buy healthier foods. In addition, the people you spend time with may influence your weight — you're more likely to become obese if you have obese friends or relatives.\par •Age. Obesity can occur at any age, even in young children. But as you age, hormonal changes and a less active lifestyle increase your risk of obesity. In addition, the amount of muscle in your body tends to decrease with age. This lower muscle mass leads to a decrease in metabolism. These changes also reduce calorie needs, and can make it harder to keep off excess weight. If you don't consciously control what you eat and become more physically active as you age, you'll likely gain weight.\par •Pregnancy. During pregnancy, a woman's weight necessarily increases. Some women find this weight difficult to lose after the baby is born. This weight gain may contribute to the development of obesity in women.\par •Quitting smoking. Quitting smoking is often associated with weight gain. And for some, it can lead to enough weight gain that the person becomes obese. In the long run, however, quitting smoking is still a greater benefit to your health than continuing to smoke.\par •Lack of sleep. Not getting enough sleep or getting too much sleep can cause changes in hormones that increase your appetite. You may also crave foods high in calories and carbohydrates, which can contribute to weight gain.\par \par Even if you have one or more of these risk factors, it doesn't mean that you're destined to become obese. You can counteract most risk factors through diet, physical activity and exercise, and behavior changes.\par \par Complications\par \par If you're obese, you're more likely to develop a number of potentially serious health problems, including:\par •High triglycerides and low high-density lipoprotein (HDL) cholesterol\par •Type 2 diabetes\par •High blood pressure\par •Metabolic syndrome — a combination of high blood sugar, high blood pressure, high triglycerides and low HDL cholesterol\par •Heart disease\par •Stroke\par •Cancer, including cancer of the uterus, cervix, endometrium, ovaries, breast, colon, rectum, esophagus, liver, gallbladder, pancreas, kidney and prostate\par •Breathing disorders, including sleep apnea, a potentially serious sleep disorder in which breathing repeatedly stops and starts\par •Gallbladder disease\par •Gynecological problems, such as infertility and irregular periods\par •Erectile dysfunction and sexual health issues\par •Nonalcoholic fatty liver disease, a condition in which fat builds up in the liver and can cause inflammation or scarring\par •Osteoarthritis\par \par Quality of life\par \par When you're obese, your overall quality of life may be diminished. You may not be able to do things you used to do, such as participating in enjoyable activities. You may avoid public places. Obese people may even encounter discrimination.\par \par Other weight-related issues that may affect your quality of life include:\par •Depression\par •Disability\par •Sexual problems\par •Shame and guilt\par •Social isolation\par •Lower work achievement\par \par Prevention\par \par Whether you're at risk of becoming obese, currently overweight or at a healthy weight, you can take steps to prevent unhealthy weight gain and related health problems. Not surprisingly, the steps to prevent weight gain are the same as the steps to lose weight: daily exercise, a healthy diet, and a long-term commitment to watch what you eat and drink.\par •Exercise regularly. You need to get 150 to 300 minutes of moderate-intensity activity a week to prevent weight gain. Moderately intense physical activities include fast walking and swimming.\par •Follow a healthy eating plan. Focus on low-calorie, nutrient-dense foods, such as fruits, veg  Patient provided verbal and written education on nutritional guidelines for bariatric surgery. Topics reviewed include portion control, avoiding soft/liquid calories, choosing low/moderate fat foods and cooking methods, mindful eating strategies, adequate intake and good sources of protein, diet advancement, increased mastication, satiety cues, metrics for selecting meal replacers, and the importance of lifelong vitamin/mineral supplementation. Realistic weight loss goals and habits of successful patients highlighted including eating at regular intervals, planning meals and snacks in advance, and engaging in moderate to intense physical activity on most days. The potential for weight regain or poor weight loss without adherence to guidelines was strongly reiterated. Patient advised that commitment to program recommendations is essential to promote successful weight loss and achieve healthy weight maintenance. [id number] \par Expectations/Readiness: Patient demonstrates comprehension of education provided and from a nutrition perspective appears to be an appropriate candidate for surgery [id number] \par Recommendations:. Reduce beverages with calories using strategies discussed\par Work on creating meal schedule with cutoff time for last meal to reduce late night intake\par Choose healthier options when ordering out using suggestions provided\par Consider using meal replacer once daily\par \par \par ?Migraine headaches – Migraine headaches often start off mild and then get worse. They often affect just 1 side of the head. The pain often feels like it is pounding or throbbing. Routine activities like walking or climbing stairs can make the headache worse. Migraines can also cause nausea or vomiting, or make you sensitive to light and sound.\par \par \par \par Is there anything I can do to feel better when I have a headache?\par Yes. Some people feel better if they:\par \par ?Take non-prescription pain medicines (but check with your doctor first if you have a health condition or already take prescription medicines)\par \par \par ?Lie down in a cool, dark, quiet room (this works best for migraine headaches)\par \par \par \par Should I see a doctor or nurse?\par See a doctor or nurse right away if:\par \par ?Your headache comes on suddenly, quickly becomes severe, or could be described as "the worst headache of your life"\par \par \par ?You have a fever or stiff neck with your headache\par \par \par ?You also have a seizure, personality changes or confusion, or you pass out\par \par \par ?Your headache began right after you exercised or had a minor injury\par \par \par ?You have new headaches, especially if you are pregnant or older than 40\par \par \par ?You have weakness, numbness, or trouble seeing (migraine headaches can sometimes cause these symptoms, but you should be seen right away the first time these symptoms happen)\par \par \par You should also see a doctor or nurse if you get headaches often or if your headaches are severe.\par \par \par Is there anything I can do to keep from getting headaches?\par Yes. Some people find that their headaches are triggered by certain foods or things they do. To keep from getting headaches in the future, you can keep a "headache calendar." In the calendar, write down every time you have a headache and what you ate and did before it started. That way you can find out if there is anything you should avoid eating or doing. You can also write down what medicine you took for the headache and whether or not it helped.\par \par Some common headache triggers include:\par \par ?Stress\par \par ?Skipping meals or eating too little\par \par ?Having too little or too much caffeine\par \par ?Sleeping too much or too little\par \par ?Drinking alcohol\par \par ?Certain drinks or foods\par \par \par If your headaches are frequent, severe, or long-lasting, your doctor can suggest ways to try to prevent them. For example, it might help to learn relaxation techniques and ways to manage stress. In some cases, medicines can also help.\par \par \par How is headache treated?\par There are lots of medicines that can ease the pain of headaches. You can try taking acetaminophen (sample brand name: Tylenol), ibuprofen (sample brand names: Advil, Motrin), or naproxen (sample brand name: Aleve). There are prescription medicines that can help, too. The right medicine for you will depend on what type of headaches you get, how often you get them, and how bad they are.\par \par If you get headaches often, work with your doctor to find a treatment that helps. Do not try to manage frequent headaches on your own with non-prescription pain medicines. Taking non-prescription pain medicines too often can actually cause more headaches later.\par 4.  abd bloating  discussed Rule of 2's; pt should avoid eating too much; too fast; too spicy; too lousy; less than two hours before bed \par -Things to avoid including overeating, spicy foods, tight clothing, eating within three hours of bed, this list is not all inclusive. \par -For treatment of reflux, possible options discussed including diet control, H2 blockers, PPIs, as well as coating motility agents discussed as treatment options. Timing of meals and proximity of last meal to sleep were discussed. If symptoms persist, a formal gastrointestinal evaluation is needed. \par \par   will check for  hpylori gluten sensitivity.  \par 5. sleep apnea is associated with adverse clinical consequences which an affect most organ systems. Cardiovascular disease risk includes arrhythmias, atrial fibrillation, hypertension, coronary artery disease, and stroke. Metabolic disorders include diabetes type 2, non-alcoholic fatty liver disease. Mood disorder especially depression; and cognitive decline especially in the elderly. Associations with chronic reflux/Douglas’s esophagus some but not all inclusive. \par -Reasons include arousal consistent with hypopnea; respiratory events most prominent in REM sleep or supine position; therefore sleep staging and body position are important for accurate diagnosis and estimation of AHI. \par \par

## 2022-01-04 NOTE — PHYSICAL EXAM
[Well Developed] : well developed [Well Nourished] : well nourished [Conjunctiva] : the conjunctiva were normal in both eyes [PERRL] : pupils were equal in size, round, and reactive to light [EOM Intact] : extraocular movements were intact [Normal Appearance] : was normal in appearance [Neck Supple] : was supple [Rate ___] : at [unfilled] breaths per minute [Normal Rhythm/Effort] : normal respiratory rhythm and effort [Clear Bilaterally] : the lungs were clear to auscultation bilaterally [Normal to Percussion] : the lungs were normal to percussion [Heart Rate ___] : [unfilled] bpm [Normal Rate] : normal [Rhythm Regular] : regular [Normal S1] : normal S1 [Normal S2] : normal S2 [No Murmur] : no murmurs heard [No Pitting Edema] : no pitting edema present [2+] : left 2+ [No Abnormalities] : the abdominal aorta was not enlarged and no bruit was heard [Examination Of The Breasts] : a normal appearance [No Discharge] : no discharge [Obese] : obese [Soft, Nontender] : the abdomen was soft and nontender [No Mass] : no masses were palpated [None] : no CVA tenderness [No Lymphangitis] : no lymphangitis observed [Normal Kyphosis] : normal kyphosis [No Visual Abnormalities] : no visible abnormalities [Normal Lordosis] : normal lordosis [No Scoliosis] : no scoliosis [No Tenderness to Palpation] : no spine tenderness on palpation [No Masses] : no masses [Full ROM] : full ROM [No Pain with ROM] : no pain with motion in any direction [Intact] : all reflexes within normal limits bilaterally [Normal Station and Gait] : the gait and station were normal [Normal Motor Tone] : the muscle tone was normal [Involuntary Movements] : no involuntary movements were seen [Normal Scalp] : inspection of the scalp showed no abnormalities [Examination Of The Hair] : texture and distribution of hair was normal [Complexion Medium] : medium complexion [Multiple Tattoos] : multiple tattoos observed [Normal] : the deep tendon reflexes were normal [Normal Mental Status] : the patient's orientation, memory, attention, language and fund of knowledge were normal [Appropriate] : appropriate [Enlarged Diffusely] : was not enlarged [JVP Elevated ___cm] : the JVP was not elevated [S3] : no S3 [S4] : no S4 [Rt] : no varicose veins of the right leg [Lt] : no varicose veins of the left leg [Right Carotid Bruit] : no bruit heard over the right carotid [Left Carotid Bruit] : no bruit heard over the left carotid [Right Femoral Bruit] : no bruit heard over the right femoral artery [Left Femoral Bruit] : no bruit heard over the left femoral artery [Bruit] : no bruit heard [Postauricular Lymph Nodes Enlarged Bilaterally] : nodes not enlarged [Preauricular Lymph Nodes Enlarged Bilaterally] : nodes not enlarged [Submandibular Lymph Nodes Enlarged Bilaterally] : nodes not enlarged [Suboccipital Lymph Nodes Enlarged Bilaterally] : nodes not enlarged [Submental Lymph Nodes Enlarged] : nodes not enlarged [Cervical Lymph Nodes Enlarged Posterior Bilaterally] : nodes not enlarged [Cervical Lymph Nodes Enlarged Anterior Bilaterally] : nodes not enlarged [Supraclavicular Lymph Nodes Enlarged Bilaterally] : nodes not enlarged [Axillary Lymph Nodes Enlarged Bilaterally] : nodes not enlarged [Epitrochlear Lymph Nodes Enlarged Bilaterally] : nodes not enlarged [Femoral Lymph Nodes Enlarged Bilaterally] : nodes not enlarged [Inguinal Lymph Nodes Enlarged Bilaterally] : nodes not enlarged [Abnormal Color] : normal color and pigmentation [Skin Lesions 1] : no skin lesions were observed [Skin Turgor Decreased] : normal skin turgor [Impaired judgment] : intact judgment [Impaired Insight] : intact insight [de-identified] : tongue normal teeth in good repair  [de-identified] : done by gyn

## 2022-01-04 NOTE — COUNSELING
[Fall prevention counseling provided] : Fall prevention counseling provided [Adequate lighting] : Adequate lighting [No throw rugs] : No throw rugs [Use proper foot wear] : Use proper foot wear [Use recommended devices] : Use recommended devices [Sleep ___ hours/day] : Sleep [unfilled] hours/day [Engage in a relaxing activity] : Engage in a relaxing activity [Plan in advance] : Plan in advance [Potential consequences of obesity discussed] : Potential consequences of obesity discussed [Benefits of weight loss discussed] : Benefits of weight loss discussed [Structured Weight Management Program suggested:] : Structured weight management program suggested [Encouraged to maintain food diary] : Encouraged to maintain food diary [Encouraged to increase physical activity] : Encouraged to increase physical activity [Encouraged to use exercise tracking device] : Encouraged to use exercise tracking device [Target Wt Loss Goal ___] : Weight Loss Goals: Target weight loss goal [unfilled] lbs [Weigh Self Weekly] : weigh self weekly [Decrease Portions] : decrease portions [____ min/wk Activity] : [unfilled] min/wk activity [Keep Food Diary] : keep food diary [FreeTextEntry2] : ideal 123-141  she is 89 bmi35

## 2022-01-04 NOTE — HEALTH RISK ASSESSMENT
[Good] : ~his/her~ current health as good [Very Good] : ~his/her~  mood as very good [Never] : Never [Yes] : Yes [Monthly or less (1 pt)] : Monthly or less (1 point) [1 or 2 (0 pts)] : 1 or 2 (0 points) [Never (0 pts)] : Never (0 points) [No] : In the past 12 months have you used drugs other than those required for medical reasons? No [No falls in past year] : Patient reported no falls in the past year [0] : 2) Feeling down, depressed, or hopeless: Not at all (0) [PHQ-2 Negative - No further assessment needed] : PHQ-2 Negative - No further assessment needed [Patient reported PAP Smear was normal] : Patient reported PAP Smear was normal [HIV Test offered] : HIV Test offered [Hepatitis C test offered] : Hepatitis C test offered [None] : None [With Family] : lives with family [# of Members in Household ___] :  household currently consist of [unfilled] member(s) [Employed] : employed [College] : College [Single] : single [# Of Children ___] : has [unfilled] children [Sexually Active] : sexually active [Feels Safe at Home] : Feels safe at home [Fully functional (bathing, dressing, toileting, transferring, walking, feeding)] : Fully functional (bathing, dressing, toileting, transferring, walking, feeding) [Fully functional (using the telephone, shopping, preparing meals, housekeeping, doing laundry, using] : Fully functional and needs no help or supervision to perform IADLs (using the telephone, shopping, preparing meals, housekeeping, doing laundry, using transportation, managing medications and managing finances) [Seat Belt] :  uses seat belt [FreeTextEntry1] : headaches weight  [de-identified] : gyn on dr Mittal  [Audit-CScore] : 1 [de-identified] : none  [de-identified] : none  [[id number]] : 0 [Change in mental status noted] : No change in mental status noted [Language] : denies difficulty with language [Behavior] : denies difficulty with behavior [Learning/Retaining New Information] : denies difficulty learning/retaining new information [Handling Complex Tasks] : denies difficulty handling complex tasks [Reasoning] : denies difficulty with reasoning [Spatial Ability and Orientation] : denies difficulty with spatial ability and orientation [Reports changes in hearing] : Reports no changes in hearing [Reports changes in vision] : Reports no changes in vision [Reports changes in dental health] : Reports no changes in dental health [Smoke Detector] : no smoke detector [Carbon Monoxide Detector] : no carbon monoxide detector [Guns at Home] : no guns at home [Safety elements used in home] : no safety elements used in home [Sunscreen] : does not use sunscreen [Travel to Developing Areas] : does not  travel to developing areas [TB Exposure] : is not being exposed to tuberculosis [Caregiver Concerns] : does not have caregiver concerns [PapSmearDate] : 2months ago  [FreeTextEntry2] : JEREMY [de-identified] : last eye exam 6months  [de-identified] : last dental 6months ago  [de-identified] : needs to put up smoke detectors and co detectors  [AdvancecareDate] : 01/22

## 2022-01-04 NOTE — HISTORY OF PRESENT ILLNESS
[FreeTextEntry1] : new pt  [de-identified] : Pt is a 31 yrold woman who is here to establish medical care.    pt states she ahs had headaches since a teenage but has never been dx with migraines.  she states they are on left side of her head and fore head.  the headaches are throbbing  and doesn’t affect her vision.  the level of pain is 5/10 and has them once a week and when it  occurs it lasts for a few day.  She takes Tylenol and now Excedrin which helps.  she doesn’t have change of vision  , nausea or vomiting with headaches.  She is unsure what brings it on but can occur prior to her menstruation.  She has  phobophobia at times and loud sounds can bother her as well.  she sleeps 7 hrs during the night . She had a sleep study and has not fu she doesn’t know the results.

## 2022-01-05 LAB
25(OH)D3 SERPL-MCNC: 21.8 NG/ML
ALBUMIN SERPL ELPH-MCNC: 4.7 G/DL
ALP BLD-CCNC: 89 U/L
ALT SERPL-CCNC: 29 U/L
ANION GAP SERPL CALC-SCNC: 15 MMOL/L
APPEARANCE: ABNORMAL
AST SERPL-CCNC: 19 U/L
BASOPHILS # BLD AUTO: 0.08 K/UL
BASOPHILS NFR BLD AUTO: 0.8 %
BILIRUB SERPL-MCNC: 0.2 MG/DL
BILIRUBIN URINE: NEGATIVE
BLOOD URINE: ABNORMAL
BUN SERPL-MCNC: 12 MG/DL
CALCIUM SERPL-MCNC: 9.5 MG/DL
CHLORIDE SERPL-SCNC: 102 MMOL/L
CHOLEST SERPL-MCNC: 236 MG/DL
CO2 SERPL-SCNC: 22 MMOL/L
COLOR: YELLOW
CREAT SERPL-MCNC: 0.67 MG/DL
CRP SERPL-MCNC: 8 MG/L
EOSINOPHIL # BLD AUTO: 0.18 K/UL
EOSINOPHIL NFR BLD AUTO: 1.7 %
ERYTHROCYTE [SEDIMENTATION RATE] IN BLOOD BY WESTERGREN METHOD: 38 MM/HR
ESTIMATED AVERAGE GLUCOSE: 123 MG/DL
FOLATE SERPL-MCNC: 19.9 NG/ML
FRUCTOSAMINE SERPL-MCNC: 202 UMOL/L
GLUCOSE QUALITATIVE U: NEGATIVE
GLUCOSE SERPL-MCNC: 86 MG/DL
HBA1C MFR BLD HPLC: 5.9 %
HBV CORE IGG+IGM SER QL: NONREACTIVE
HBV SURFACE AB SER QL: REACTIVE
HBV SURFACE AG SER QL: NONREACTIVE
HCT VFR BLD CALC: 41.7 %
HCV AB SER QL: NONREACTIVE
HCV S/CO RATIO: 0.09 S/CO
HDLC SERPL-MCNC: 58 MG/DL
HEPATITIS A IGG ANTIBODY: REACTIVE
HGB BLD-MCNC: 13.2 G/DL
HIV1+2 AB SPEC QL IA.RAPID: NONREACTIVE
IGA SER QL IEP: 154 MG/DL
IMM GRANULOCYTES NFR BLD AUTO: 0.4 %
KETONES URINE: NEGATIVE
LDLC SERPL CALC-MCNC: 154 MG/DL
LEUKOCYTE ESTERASE URINE: NEGATIVE
LYMPHOCYTES # BLD AUTO: 4.02 K/UL
LYMPHOCYTES NFR BLD AUTO: 37.7 %
MAGNESIUM SERPL-MCNC: 1.9 MG/DL
MAN DIFF?: NORMAL
MCHC RBC-ENTMCNC: 29 PG
MCHC RBC-ENTMCNC: 31.7 GM/DL
MCV RBC AUTO: 91.6 FL
MEV IGG FLD QL IA: 67.8 AU/ML
MEV IGG+IGM SER-IMP: POSITIVE
MONOCYTES # BLD AUTO: 0.4 K/UL
MONOCYTES NFR BLD AUTO: 3.8 %
MUV AB SER-ACNC: POSITIVE
MUV IGG SER QL IA: 198 AU/ML
NEUTROPHILS # BLD AUTO: 5.93 K/UL
NEUTROPHILS NFR BLD AUTO: 55.6 %
NITRITE URINE: NEGATIVE
NONHDLC SERPL-MCNC: 178 MG/DL
PH URINE: 6
PLATELET # BLD AUTO: 384 K/UL
POTASSIUM SERPL-SCNC: 4.3 MMOL/L
PROT SERPL-MCNC: 7.4 G/DL
PROTEIN URINE: NORMAL
RBC # BLD: 4.55 M/UL
RBC # FLD: 13.5 %
RUBV IGG FLD-ACNC: 11 INDEX
RUBV IGG SER-IMP: POSITIVE
SODIUM SERPL-SCNC: 139 MMOL/L
SPECIFIC GRAVITY URINE: 1.02
T PALLIDUM AB SER QL IA: NEGATIVE
TRIGL SERPL-MCNC: 119 MG/DL
TSH SERPL-ACNC: 0.53 UIU/ML
UREA BREATH TEST QL: NEGATIVE
UROBILINOGEN URINE: NORMAL
VIT B12 SERPL-MCNC: 506 PG/ML
VZV AB TITR SER: NEGATIVE
VZV IGG SER IF-ACNC: 134.7 INDEX
WBC # FLD AUTO: 10.65 K/UL

## 2022-01-06 ENCOUNTER — TRANSCRIPTION ENCOUNTER (OUTPATIENT)
Age: 32
End: 2022-01-06

## 2022-01-06 LAB — ANA SER IF-ACNC: NEGATIVE

## 2022-01-07 LAB
BARLEY IGE QN: <0.1 KUA/L
CHERRY IGE QN: <0.1 KUA/L
COW MILK IGE QN: 0.15 KUA/L
CRAB IGE QN: <0.1 KUA/L
DEPRECATED BARLEY IGE RAST QL: 0
DEPRECATED CHERRY IGE RAST QL: 0
DEPRECATED COW MILK IGE RAST QL: NORMAL
DEPRECATED CRAB IGE RAST QL: 0
DEPRECATED EGG WHITE IGE RAST QL: NORMAL
DEPRECATED OAT IGE RAST QL: 0
DEPRECATED PEANUT IGE RAST QL: 0
DEPRECATED RYE IGE RAST QL: 0
DEPRECATED SOYBEAN IGE RAST QL: 0
DEPRECATED WHEAT IGE RAST QL: 0
EGG WHITE IGE QN: 0.14 KUA/L
M TB IFN-G BLD-IMP: NEGATIVE
OAT IGE QN: <0.1 KUA/L
PEANUT IGE QN: <0.1 KUA/L
QUANTIFERON TB PLUS MITOGEN MINUS NIL: 1.28 IU/ML
QUANTIFERON TB PLUS NIL: 0.01 IU/ML
QUANTIFERON TB PLUS TB1 MINUS NIL: 0 IU/ML
QUANTIFERON TB PLUS TB2 MINUS NIL: 0.01 IU/ML
RYE IGE QN: <0.1 KUA/L
SOYBEAN IGE QN: <0.1 KUA/L
TOTAL IGE SMQN RAST: 115 KU/L
TTG IGA SER IA-ACNC: <1.2 U/ML
TTG IGA SER-ACNC: NEGATIVE
TTG IGG SER IA-ACNC: <1.2 U/ML
TTG IGG SER IA-ACNC: NEGATIVE
WHEAT IGE QN: <0.1 KUA/L

## 2022-01-12 ENCOUNTER — OUTPATIENT (OUTPATIENT)
Dept: OUTPATIENT SERVICES | Facility: HOSPITAL | Age: 32
LOS: 1 days | End: 2022-01-12
Payer: COMMERCIAL

## 2022-01-12 ENCOUNTER — APPOINTMENT (OUTPATIENT)
Dept: CT IMAGING | Facility: HOSPITAL | Age: 32
End: 2022-01-12
Payer: COMMERCIAL

## 2022-01-12 DIAGNOSIS — Z98.890 OTHER SPECIFIED POSTPROCEDURAL STATES: Chronic | ICD-10-CM

## 2022-01-12 DIAGNOSIS — R51.9 HEADACHE, UNSPECIFIED: ICD-10-CM

## 2022-01-12 PROCEDURE — 70450 CT HEAD/BRAIN W/O DYE: CPT | Mod: 26

## 2022-01-12 PROCEDURE — 70450 CT HEAD/BRAIN W/O DYE: CPT

## 2022-04-18 ENCOUNTER — APPOINTMENT (OUTPATIENT)
Dept: INTERNAL MEDICINE | Facility: CLINIC | Age: 32
End: 2022-04-18
Payer: COMMERCIAL

## 2022-04-18 VITALS
WEIGHT: 198 LBS | OXYGEN SATURATION: 97 % | TEMPERATURE: 97.7 F | HEIGHT: 63 IN | DIASTOLIC BLOOD PRESSURE: 71 MMHG | HEART RATE: 68 BPM | RESPIRATION RATE: 15 BRPM | BODY MASS INDEX: 35.08 KG/M2 | SYSTOLIC BLOOD PRESSURE: 114 MMHG

## 2022-04-18 DIAGNOSIS — R79.89 OTHER SPECIFIED ABNORMAL FINDINGS OF BLOOD CHEMISTRY: ICD-10-CM

## 2022-04-18 DIAGNOSIS — R14.0 ABDOMINAL DISTENSION (GASEOUS): ICD-10-CM

## 2022-04-18 DIAGNOSIS — N87.1 MODERATE CERVICAL DYSPLASIA: ICD-10-CM

## 2022-04-18 DIAGNOSIS — E55.9 VITAMIN D DEFICIENCY, UNSPECIFIED: ICD-10-CM

## 2022-04-18 DIAGNOSIS — E78.5 HYPERLIPIDEMIA, UNSPECIFIED: ICD-10-CM

## 2022-04-18 PROCEDURE — 99213 OFFICE O/P EST LOW 20 MIN: CPT

## 2022-04-18 RX ORDER — CLINDAMYCIN PHOSPHATE 10 MG/ML
1 LOTION TOPICAL
Qty: 60 | Refills: 0 | Status: COMPLETED | COMMUNITY
Start: 2022-02-22

## 2022-04-18 RX ORDER — CEPHALEXIN 500 MG/1
500 CAPSULE ORAL
Qty: 60 | Refills: 0 | Status: COMPLETED | COMMUNITY
Start: 2022-02-22

## 2022-04-18 NOTE — COUNSELING
[Sleep ___ hours/day] : Sleep [unfilled] hours/day [Engage in a relaxing activity] : Engage in a relaxing activity [Plan in advance] : Plan in advance [Potential consequences of obesity discussed] : Potential consequences of obesity discussed [Benefits of weight loss discussed] : Benefits of weight loss discussed [Structured Weight Management Program suggested:] : Structured weight management program suggested [Encouraged to maintain food diary] : Encouraged to maintain food diary [Encouraged to increase physical activity] : Encouraged to increase physical activity [Encouraged to use exercise tracking device] : Encouraged to use exercise tracking device [Weigh Self Weekly] : weigh self weekly [Decrease Portions] : decrease portions [____ min/wk Activity] : [unfilled] min/wk activity [Keep Food Diary] : keep food diary [FreeTextEntry1] : 1714cal

## 2022-04-18 NOTE — DATA REVIEWED
[FreeTextEntry1] : reviewed  she had blood in her urine and was  menstruating at time according to her  calender

## 2022-04-18 NOTE — HEALTH RISK ASSESSMENT
[Never] : Never [No] : No [No falls in past year] : Patient reported no falls in the past year [0] : 2) Feeling down, depressed, or hopeless: Not at all (0) [PHQ-2 Negative - No further assessment needed] : PHQ-2 Negative - No further assessment needed [de-identified] : none  [de-identified] : none  [[id number]] : 0

## 2022-04-18 NOTE — ASSESSMENT
[FreeTextEntry1] : 1 abd bloating  avoid wheat    discussed Rule of 2's; pt should avoid eating too much; too fast; too spicy; too lousy; less than two hours before bed \par -Things to avoid including overeating, spicy foods, tight clothing, eating within three hours of bed, this list is not all inclusive. \par -For treatment of reflux, possible options discussed including diet control, H2 blockers, PPIs, as well as coating motility agents discussed as treatment options. Timing of meals and proximity of last meal to sleep were discussed. If symptoms persist, a formal gastrointestinal evaluation is needed. \par \par 2.   hld  Discussed intake of plant based foods, including vegetables, fruits, and whole grain foods: legumes, nuts and seeds, fish or seafood, lean meats, and non-fat or low-fat diary foods. Plant based oils (non-tropical) in place of solid fats. Instructed patient to limit intake of high fat meats and processed meats, high-fat diary foods, dietary cholesterol and sodium, foods and beverages with added sugars. \par \par 3  .prediabetes  GLYCEMIC INDEX:\par Foods can be measured by how much they are likely to raise blood sugar. This is called the glycemic index. We want you to eat mostly foods that have a low glycemic index. \par Fruit: choose cherries, grapefruit, prunes, dried apricots, apples, peaches, pears, blueberries, strawberries, oranges, and grapes. \par Breads and other starches: Choose pumpernickel, rye, sourdough, or stone-ground whole wheat bread. For cereal, choose bran flakes, oat flakes, and oatmeal. For rice, use long-grained white rice. Most pasta is fairly low on the glycemic index; whole wheat or egg pasta is the lowest. Choose new or sweet potatoes and yams. \par Dairy products: Dairy tends to be fairly low on the glycemic index because of the protein and fat in it. Premium ice cream is lower on the glycemic index than low-fat ice cream. \par Salty snacks: Hummus, peanuts, walnuts, and cashews are all good choices. \par Sweets: Most sweets are high on the glycemic index, so make them special treats only. Ones that have protein and fat in them tend to be a bit lower. Milk chocolate or peanut candies are good choices. Pound and sponge cakes are lower on the glycemic index than other types of cakes. For cookies, choose peanut butter, chocolate chip, butter, and oatmeal cookies. For a breakfast treat, choose scones or oatmeal muffins. \par Beans: Choose scotty, chickpeas (garbanzo beans), lentils, split peas, lima beans, and kidney beans. \par Meat and vegetables are low on the glycemic index. Soups and stews made with meat or vegetables are also good.\par \par 4.  sleep apnea is associated with adverse clinical consequences which an affect most organ systems. Cardiovascular disease risk includes arrhythmias, atrial fibrillation, hypertension, coronary artery disease, and stroke. Metabolic disorders include diabetes type 2, non-alcoholic fatty liver disease. Mood disorder especially depression; and cognitive decline especially in the elderly. Associations with chronic reflux/Douglas’s esophagus some but not all inclusive. \par -Reasons include arousal consistent with hypopnea; respiratory events most prominent in REM sleep or supine position; therefore sleep staging and body position are important for accurate diagnosis and estimation of AHI. \par \par 5 vit d  insuf  We discussed vit D and risks and understands the importance of taking the vitamin.  . Vitamin D is a nutrient found in some foods that is needed for health and to maintain strong bones. It does so by helping the body absorb calcium (one of bone’s main building blocks) from food and supplements. People who get too little vitamin D may develop soft, thin, and brittle bones, a condition known as rickets in children and osteomalacia in adults.\par \par Vitamin D is important to the body in many other ways as well. Muscles need it to move, for example, nerves need it to carry messages between the brain and every body part, and the immune system needs vitamin D to fight off invading bacteria and viruses. Together with calcium, vitamin D also helps protect older adults from osteoporosis. Vitamin D is found in cells throughout the body.\par \par How much vitamin D do I need?\par \par The amount of vitamin D you need each day depends on your age. Average daily recommended amounts from the Food and Nutrition Board (a national group of experts) for different ages are listed below in International Units (IU):\par \par \par Life Stage\par \par Recommended Amount\par \par \par Birth to 12 months 400 IU \par Children 1-13 years 600 IU \par Teens 14-18 years 600 IU \par Adults 19-70 years 600 IU \par Adults 71 years and older 800 IU \par Pregnant and breastfeeding women 600 IU \par   \par What foods provide vitamin D?\par \par Very few foods naturally have vitamin D. Fortified foods provide most of the vitamin D in American diets.\par •Fatty fish such as salmon, tuna, and mackerel are among the best sources.\par •Beef liver, cheese, and egg yolks provide small amounts.\par •Mushrooms provide some vitamin D. In some mushrooms that are newly available in stores, the vitamin D content is being boosted by exposing these mushrooms to ultraviolet light.\par •Almost all of the [place] milk supply is fortified with 400 IU of vitamin D per quart. But foods made from milk, like cheese and ice cream, are usually not fortified.\par •Vitamin D is added to many breakfast cereals and to some brands of orange juice, yogurt, margarine, and soy beverages; check the labels.\par \par Can I get vitamin D from the sun?\par \par The body makes vitamin D when skin is directly exposed to the sun, and most people meet at least some of their vitamin D needs this way. Skin exposed to sunshine indoors through a window will not produce vitamin D. Cloudy days, shade, and having dark-colored skin also cut down on the amount of vitamin D the skin makes.\par \par However, despite the importance of the sun to vitamin D synthesis, it is prudent to limit exposure of skin to sunlight in order to lower the risk for skin cancer. When out in the sun for more than a few minutes, wear protective clothing and apply sunscreen with an SPF (sun protection factor) of 8 or more. Tanning beds also cause the skin to make vitamin D, but pose similar risks for skin cancer.\par \par People who avoid the sun or who cover their bodies with sunscreen or clothing should include good sources of vitamin D in their diets or take a supplement. Recommended intakes of vitamin D are set on the assumption of little sun exposure.\par \par What kinds of vitamin D dietary supplements are available?\par \par Vitamin D is found in supplements (and fortified foods) in two different forms: D2 (ergocalciferol) and D3 (cholecalciferol). Both increase vitamin D in the blood.\par \par Am I getting enough vitamin D?\par \par Because vitamin D can come from sun, food, and supplements, the best measure of one’s vitamin D status is blood levels of a form known as 25-hydroxyvitamin D. Levels are described in either nanomoles per liter (nmol/L) or nanograms per milliliter (ng/mL), where 1 nmol/L = 0.4 ng/mL.\par \par In general, levels below 30 nmol/L (12 ng/mL) are too low for bone or overall health, and levels above 125 nmol/L (50 ng/mL) are probably too high. Levels of 50 nmol/L or above (20 ng/mL or above) are sufficient for most people.\par \par By these measures, some Americans are vitamin D deficient and almost no one has levels that are too high. In general, young people have higher blood levels of 25-hydroxyvitamin D than older people and males have higher levels than females. By race, non- blacks tend to have the lowest levels and non- whites the highest. The majority of Americans have blood levels lower than 75 nmol/L (30 ng/mL).\par \par Certain other groups may not get enough vitamin D:\par • infants, because human milk is a poor source of the nutrient.  infants should be given a supplement of 400 IU of vitamin D each day.\par •Older adults, because their skin doesn’t make vitamin D when exposed to sunlight as efficiently as when they were young, and their kidneys are less able to convert vitamin D to its active form.\par •People with dark skin, because their skin has less ability to produce vitamin D from the sun.\par •People with disorders such as Crohn’s disease or celiac disease who don’t handle fat properly, because vitamin D needs fat to be absorbed.\par •Obese people, because their body fat binds to some vitamin D and prevents it from getting into the blood.\par \par What happens if I don’t get enough vitamin D?\par \par People can become deficient in vitamin D because they don’t consume enough or absorb enough from food, their exposure to sunlight is limited, or their kidneys cannot convert vitamin D to its active form in the body. In children, vitamin D deficiency causes rickets, a condition in which the bones become soft and bend. It’s a rare disease but still occurs, especially among  infants and children. In adults, vitamin D deficiency leads to osteomalacia, causing bone pain and muscle weakness.\par \par What are some effects of vitamin D on health?\par \par Vitamin D is being studied for its possible connections to several diseases and medical problems, including diabetes, hypertension, and autoimmune conditions such as multiple sclerosis. Two of them discussed below are bone disorders and some types of cancer.\par \par Bone disorders\par \par As they get older, millions of people (mostly women, but men too) develop, or are at risk of, osteoporosis, condition in which bones become fragile and may fracture if one falls. It is one consequence of not getting enough calcium and vitamin D over the long term. Supplements of both vitamin D3 (at 700-800 IU/day) and calcium (500-1,200 mg/day) have been shown to reduce the risk of bone loss and fractures in elderly people aged 62-85 years. Men and women should talk with their healthcare providers about their needs for vitamin D (and calcium) as part of an overall plan to prevent or treat osteoporosis.\par \par Cancer\par \par Some studies suggest that vitamin D may protect against colon cancer and perhaps even cancers of the prostate and breast. But higher levels of vitamin D in the blood have also been linked to higher rates of pancreatic cancer. At this time, it’s too early to say whether low vitamin D status increases cancer risk and whether higher levels protect or even increase risk in some people.\par \par Can vitamin D be harmful?\par \par Yes, when amounts in the blood become too high. Signs of toxicity include nausea, vomiting, poor appetite, constipation, weakness, and weight loss. And by raising blood levels of calcium, too much vitamin D can cause confusion, disorientation, and problems with heart rhythm. Excess vitamin D can also damage the kidneys.\par \par The upper limit for vitamin D is 1,000 to 1,500 IU/day for infants, 2,500 to 3,000 IU/day for children 1-8 years, and 4,000 IU/day for children 9 years and older, adults, and pregnant and lactating teens and women. Vitamin D toxicity almost always occurs from overuse of supplements. Excessive sun exposure doesn’t cause vitamin D poisoning because the body limits the amount of this vitamin it produces.\par \par Are there any interactions with vitamin D that I should know about?\par \par Like most dietary supplements, vitamin D may interact or interfere with other medicines or supplements you might be taking. Here are several examples:\par •Prednisone and other corticosteroid medicines to reduce inflammation impair how the body handles vitamin D, which leads to lower calcium absorption \par \par

## 2022-04-18 NOTE — HISTORY OF PRESENT ILLNESS
[FreeTextEntry1] : fu  [de-identified] :  pt has mild sleep apnea and recommended cpap.  She states she didn’t get the machine.  She had seen a nutritionist at her job who is  now  out on pregnancy.    She is not exercising.  .  She is prediabetic  and eats a lot of sweet foods.

## 2022-04-18 NOTE — PLAN
[FreeTextEntry1] : "i discussed diet  exercise with pt   and would like to come monthly to have weigh ins and discussion about her diet .

## 2022-04-19 LAB
25(OH)D3 SERPL-MCNC: 29.7 NG/ML
BASOPHILS # BLD AUTO: 0.06 K/UL
BASOPHILS NFR BLD AUTO: 0.6 %
EOSINOPHIL # BLD AUTO: 0.2 K/UL
EOSINOPHIL NFR BLD AUTO: 2 %
HCT VFR BLD CALC: 44.6 %
HGB BLD-MCNC: 13.9 G/DL
IMM GRANULOCYTES NFR BLD AUTO: 0.3 %
LYMPHOCYTES # BLD AUTO: 4.21 K/UL
LYMPHOCYTES NFR BLD AUTO: 41.5 %
MAN DIFF?: NORMAL
MCHC RBC-ENTMCNC: 29.1 PG
MCHC RBC-ENTMCNC: 31.2 GM/DL
MCV RBC AUTO: 93.3 FL
MONOCYTES # BLD AUTO: 0.29 K/UL
MONOCYTES NFR BLD AUTO: 2.9 %
NEUTROPHILS # BLD AUTO: 5.35 K/UL
NEUTROPHILS NFR BLD AUTO: 52.7 %
PLATELET # BLD AUTO: 332 K/UL
RBC # BLD: 4.78 M/UL
RBC # FLD: 13.5 %
WBC # FLD AUTO: 10.14 K/UL

## 2022-04-19 RX ORDER — CHOLECALCIFEROL (VITAMIN D3) 1250 MCG
1.25 MG CAPSULE ORAL
Qty: 12 | Refills: 1 | Status: ACTIVE | COMMUNITY
Start: 2022-01-05 | End: 1900-01-01

## 2022-05-16 ENCOUNTER — APPOINTMENT (OUTPATIENT)
Dept: INTERNAL MEDICINE | Facility: CLINIC | Age: 32
End: 2022-05-16

## 2022-08-01 ENCOUNTER — APPOINTMENT (OUTPATIENT)
Dept: INTERNAL MEDICINE | Facility: CLINIC | Age: 32
End: 2022-08-01

## 2022-08-01 VITALS
HEART RATE: 74 BPM | BODY MASS INDEX: 35.61 KG/M2 | HEIGHT: 63 IN | SYSTOLIC BLOOD PRESSURE: 115 MMHG | WEIGHT: 201 LBS | DIASTOLIC BLOOD PRESSURE: 81 MMHG | OXYGEN SATURATION: 98 %

## 2022-08-01 DIAGNOSIS — F32.1 MAJOR DEPRESSIVE DISORDER, SINGLE EPISODE, MODERATE: ICD-10-CM

## 2022-08-01 DIAGNOSIS — E66.9 OBESITY, UNSPECIFIED: ICD-10-CM

## 2022-08-01 DIAGNOSIS — G47.33 OBSTRUCTIVE SLEEP APNEA (ADULT) (PEDIATRIC): ICD-10-CM

## 2022-08-01 DIAGNOSIS — R51.9 HEADACHE, UNSPECIFIED: ICD-10-CM

## 2022-08-01 DIAGNOSIS — R73.03 PREDIABETES.: ICD-10-CM

## 2022-08-01 PROCEDURE — 99214 OFFICE O/P EST MOD 30 MIN: CPT | Mod: 25

## 2022-08-01 RX ORDER — DAPSONE 50 MG/G
5 GEL TOPICAL
Qty: 60 | Refills: 0 | Status: ACTIVE | COMMUNITY
Start: 2022-07-06

## 2022-08-01 RX ORDER — METRONIDAZOLE 500 MG/1
500 TABLET ORAL
Qty: 14 | Refills: 0 | Status: ACTIVE | COMMUNITY
Start: 2022-05-31

## 2022-08-01 NOTE — ASSESSMENT
[FreeTextEntry1] : 1 depression  Clinical depression is a medical condition that goes beyond everyday sadness. It can cause profound, long-lasting symptoms and often interferes with one’s usual daily activities. A person’s vulnerability to developing this disorder is often related to many factors, including changes in brain function, genetics, and life stresses and circumstances. \par \par Depression is the most common psychiatric disorder worldwide. In the [place], nearly 20 percent of the population experiences a bout of clinical depression in their lifetime. Even so, very few people who have the disorder discuss their symptoms with a healthcare provider. Instead, two-thirds of people with depression who see a healthcare provider for routine care come in complaining of physical symptoms, such as headache, back problems, or chronic pain. \par \par People may be reluctant to discuss their depression symptoms for a number of reasons. Often they’re concerned about the stigma of mental illness; sometimes they worry that a primary care provider is not the appropriate health professional to ask; some see their condition as a personal weakness rather than a “real” illness; and some are worried about the implications of having a psychiatric illness entered into their permanent record. But effective treatments do exist, and not treating depression can lead to serious problems.\par \par People with untreated depression have a lower quality of life, a higher risk of suicide, and worse physical prognoses if they have any medical conditions besides depression. In fact, people with depression are almost twice as likely to die as people without depression, mostly due to other medical conditions. What’s more, depression affects not only the person with the disorder but also those around him or her. \par refer to psychologist and psychiatrist  \par 2 sleep apnea she has not obtained the machine and needs to  call for the cpap and fu with pulmonary  leep apnea is associated with adverse clinical consequences which an affect most organ systems. Cardiovascular disease risk includes arrhythmias, atrial fibrillation, hypertension, coronary artery disease, and stroke. Metabolic disorders include diabetes type 2, non-alcoholic fatty liver disease. Mood disorder especially depression; and cognitive decline especially in the elderly. Associations with chronic reflux/Douglas’s esophagus some but not all inclusive. \par -Reasons include arousal consistent with hypopnea; respiratory events most prominent in REM sleep or supine position; therefore sleep staging and body position are important for accurate diagnosis and estimation of AHI. \par \par 3bmi [id number]  Weight loss, exercise, and diet control were discussed and are highly encouraged. Treatment options were given such as, aqua therapy, and contacting a nutritionist. Recommended to use the elliptical, stationary bike, less use of treadmill. Mindful eating was explained to the patient Obesity is associated with worsening asthma, shortness of breath, and potential for cardiac disease, diabetes, and other underlying medical conditions.\par \par \par  pt should eat 60-70  gms of protein a day or 20-30 gms per meal This is fish chicken eggs lean meat such as chicken turkey pork and beef .  - Pt should not eat more than a 200 calorie snack  and make sure they are protein and fiber rich. she should eat 7 serving of protein, 12 servings of carbohydrates and 3 servings of non starchy vegetables and 4 servings of fat Dont eat unless you are physically hungry and never eat in front of a TV go to the kitchen table.  she should not eat more than a 1500 calories per day.\par 3 prediabetes  GLYCEMIC INDEX:\par Foods can be measured by how much they are likely to raise blood sugar. This is called the glycemic index. We want you to eat mostly foods that have a low glycemic index. \par Fruit: choose cherries, grapefruit, prunes, dried apricots, apples, peaches, pears, blueberries, strawberries, oranges, and grapes. \par Breads and other starches: Choose pumpernickel, rye, sourdough, or stone-ground whole wheat bread. For cereal, choose bran flakes, oat flakes, and oatmeal. For rice, use long-grained white rice. Most pasta is fairly low on the glycemic index; whole wheat or egg pasta is the lowest. Choose new or sweet potatoes and yams. \par Dairy products: Dairy tends to be fairly low on the glycemic index because of the protein and fat in it. Premium ice cream is lower on the glycemic index than low-fat ice cream. \par Salty snacks: Hummus, peanuts, walnuts, and cashews are all good choices. \par Sweets: Most sweets are high on the glycemic index, so make them special treats only. Ones that have protein and fat in them tend to be a bit lower. Milk chocolate or peanut candies are good choices. Pound and sponge cakes are lower on the glycemic index than other types of cakes. For cookies, choose peanut butter, chocolate chip, butter, and oatmeal cookies. For a breakfast treat, choose scones or oatmeal muffins. \par Beans: Choose scotty, chickpeas (garbanzo beans), lentils, split peas, lima beans, and kidney beans. \par Meat and vegetables are low on the glycemic index. Soups and stews made with meat or vegetables are also good.\par \par   PResently she is not suicidal but if her symptoms worsens go to er.

## 2022-08-01 NOTE — HISTORY OF PRESENT ILLNESS
[FreeTextEntry1] : fu  [de-identified] : pt states she has not had any headaches  and is gaining weight .  She has not seen nutritionist .  She Has seen bariatric  surgeon .  She understands  if she hasn’t changed  her lifestyle changes.    she is not motivated and has  emotional changes .  She had covid recently.  She has a healthy relationship with her  boyfriend and family. She states she over thinks .  She had mild depression in past and  decreased coping skills.  She has seen psychiatrist.   She states her clothes havent fit due to her weight gain.

## 2022-08-01 NOTE — HEALTH RISK ASSESSMENT
[Never] : Never [No] : In the past 12 months have you used drugs other than those required for medical reasons? No [No falls in past year] : Patient reported no falls in the past year [3] : 1) Little interest or pleasure doing things for nearly every day (3) [2] : 2) Feeling down, depressed, or hopeless for more than half of the days (2) [Nearly Every Day (3)] : 6.) Feeling bad about yourself, or that you are a failure, or have let yourself or your family down? Nearly every day [1/2 of Days or More (2)] : 7.) Trouble concentrating on things, such as reading a newspaper or watching television? Half the days or more [Not at All (0)] : 8.) Moving or speaking so slowly that other people could have noticed, or the opposite, moving or speaking faster than usual? Not at all [Moderately Severe] : severity of depression is moderately severe [Not at all] : How difficult have these problems made it for you to do your work, take care of things at home, or get along with people? Not at all [de-identified] : none  [de-identified] : none  [[id number]] : 5 [[id number]] : 17

## 2022-08-01 NOTE — PHYSICAL EXAM
[PERRL] : pupils equal round and reactive to light [Normal Oropharynx] : the oropharynx was normal [No JVD] : no jugular venous distention [Supple] : supple [Regular Rhythm] : with a regular rhythm [Normal S1, S2] : normal S1 and S2 [No Edema] : there was no peripheral edema [No Extremity Clubbing/Cyanosis] : no extremity clubbing/cyanosis [Normal Posterior Cervical Nodes] : no posterior cervical lymphadenopathy [Normal Anterior Cervical Nodes] : no anterior cervical lymphadenopathy [Normal Mental Status] : the patient's orientation, memory, attention, language and fund of knowledge were normal [Depressed] : depressed [Normal Rate] : a normal rate [Normal Rhythm] : a normal rhythm [Normal Tone] : normal tone [Normal Articulation] : normal articulation [Normal Volume] : normal volume [Normal Fluency] : fluent [Normal Coherency] : coherent [Normal Spontaneity] : spontaneous [Normal] : normal throught processes [Normal Associations] :  no deficiency [Normal Scalp] : inspection of the scalp showed no abnormalities [Examination Of The Hair] : texture and distribution of hair was normal [Acne] : acneiform eruption [Lesions On Cheeks Both] : on both cheeks [Impaired judgment] : intact judgment [Volume Increased] : no increase in volume [Volume Decrease] : no decrease in volume [Rate Pressured] : not pressured [Rate Slowed] : not slowed [Delusions] : exhibited no delusions [Hallucinations] : exhibited no hallucinations [Obsessions] : denied obsessions [Preoccupation with Violence] : denied preoccupation with violent thoughts [Suicidal Ideation] : denied suicidal ideation [Suicidal Intent] : denied suicidal intent [Suicidal Plan] : denied suicidal plans [Homicidal Ideation] : denied homicidal ideation [Homicidal Intent] : denied homicidal intention [Homicidal Plan] : denied homicidal plans

## 2023-12-21 NOTE — ASU PREOP CHECKLIST - NSWEIGHTCALCTOOLDRUG_GEN_A_CORE
[facility] Call Center    Phone Message    May a detailed message be left on voicemail: yes     Reason for Call: Other: Pt is calling and wanting to know if her appt on 1/4/2024 with Lala is going to be able Facial and neck pain. Pt also see Marissa for her pain with her back and disk problem. Wanting to know if Lala will see her for this or if she needs to make a follow up appt with Marissa. Please call Pt back to discuss.    Action Taken: Message routed to:  [facility] ([facility] Neurosurgery    Travel Screening: Not Applicable                                                                   
Please reach out to patient for an appointment with Marissa.     Aimee Meyer LPN  Neurosurgery   
 used

## 2024-05-09 NOTE — H&P PST ADULT - CONSTITUTIONAL DETAILS
well-developed/well-nourished/well-groomed/no distress Bed/Stretcher in lowest position, wheels locked, appropriate side rails in place/Call bell, personal items and telephone in reach/Instruct patient to call for assistance before getting out of bed/chair/stretcher/Non-slip footwear applied when patient is off stretcher/[place] to call system/Physically safe environment - no spills, clutter or unnecessary equipment/Purposeful proactive rounding/Room/bathroom lighting operational, light cord in reach

## 2024-07-28 PROBLEM — N83.8 OVARIAN MASS: Status: ACTIVE | Noted: 2024-07-28

## 2024-07-29 ENCOUNTER — RESULT REVIEW (OUTPATIENT)
Age: 34
End: 2024-07-29

## 2024-07-29 ENCOUNTER — APPOINTMENT (OUTPATIENT)
Dept: GYNECOLOGIC ONCOLOGY | Facility: CLINIC | Age: 34
End: 2024-07-29
Payer: COMMERCIAL

## 2024-07-29 VITALS
RESPIRATION RATE: 16 BRPM | OXYGEN SATURATION: 95 % | SYSTOLIC BLOOD PRESSURE: 123 MMHG | BODY MASS INDEX: 28 KG/M2 | DIASTOLIC BLOOD PRESSURE: 83 MMHG | HEIGHT: 63 IN | HEART RATE: 72 BPM | WEIGHT: 158 LBS

## 2024-07-29 DIAGNOSIS — R87.613 HIGH GRADE SQUAMOUS INTRAEPITHELIAL LESION ON CYTOLOGIC SMEAR OF CERVIX (HGSIL): ICD-10-CM

## 2024-07-29 DIAGNOSIS — N83.299 OTHER OVARIAN CYST, UNSPECIFIED SIDE: ICD-10-CM

## 2024-07-29 DIAGNOSIS — N83.8 OTHER NONINFLAMMATORY DISORDERS OF OVARY, FALLOPIAN TUBE AND BROAD LIGAMENT: ICD-10-CM

## 2024-07-29 PROCEDURE — 99205 OFFICE O/P NEW HI 60 MIN: CPT

## 2024-07-29 PROCEDURE — 99459 PELVIC EXAMINATION: CPT

## 2024-07-30 NOTE — CHIEF COMPLAINT
[FreeTextEntry1] : New Patient Consultation for pelvic mass Patient last seen 6/21/2021 for HSIL Pap, ovarian cyst.

## 2024-07-30 NOTE — PHYSICAL EXAM
[Fully active, able to carry on all pre-disease performance without restriction] : Status 0 - Fully active, able to carry on all pre-disease performance without restriction [Chaperone Present] : A chaperone was present in the examining room during all aspects of the physical examination [71058] : A chaperone was present during the pelvic exam. [Abnormal] : Adnexa(ae): Abnormal [Normal] : Parametria: Normal [de-identified] : no lesions or nodules [de-identified] : Lesion noted at 2 oclock, biopsied.  [de-identified] : Large mass noted to the level of the umbilicus  [de-identified] : smooth rectovag septum, no nodularity

## 2024-07-30 NOTE — HISTORY OF PRESENT ILLNESS
[FreeTextEntry1] : GYN/Ref:  Flavia Gallegos MD  Ms. Crow is a 34 years old presenting for history of a right ovarian cyst; endometrial polyp and h/o HSIL / NISREEN II-III .  She was last seen 2021 and advised to follow-up with GYN as there was a stable ovarian cyst or follow-up Pap.  She presents today with an enlarged ovarian cyst.   She reports she had followed regularly with a gynecologist for ultrasounds over which time the cyst appeared stable. She started seeing another gynecologist to discuss concerns about increasing acne, in the course of which time she estimates it had been about a year between surveillance ultrasounds. When she went for repeat imaging she was told she had a large cyst on the right and a new smaller cyst on the left (possibly dermoid).  She reports persistent bloating and a sense of incomplete evacuations with bowel movements but otherwise denies abdominal pain, N/V, difficulty voiding.   Reports she thinks she is up to date with Paps and last Pap was normal but wishes to have repeat Pap performed today.    Most recent imagin2024 MRI pelvis ([facility]) UTERUS: The uterus is anteverted measuring 8.8 x 4.4 x 4.7 cm (craniocaudal by AP by transverse). There is no discrete fibroid. The endometrial stripe signal intensity thickness is within normal limits measuring approximately 0.5 cm. The junctional zone shows no significant thickening. OVARIES/ADNEXA: Arising from the left ovary is a fat-containing cyst measuring 2.3 x 2 x 1.9 cm diagnostic of a dermoid cyst. There is also a corpus luteum arising from the left ovary. This is new since the prior study. Occupying most of the right anterior aspect of the pelvis and extending into the lower abdomen to the level of the umbilicus is a large multilocular cystic lesion measuring approximately 12.2 x 9.6 x 14 cm. It shows thin septations with enhancement. On the prior examination, there was a cyst measuring approximately 4 cm in diameter. URINARY BLADDER: The bladder shows minimal distention without filling defect or wall thickening. ASCITES: There is no ascites. BONY STRUCTURES: Intact. MAJOR PELVIC VESSELS: Patent. OTHER FINDINGS: N/A. IMPRESSION: 1.  Left ovarian dermoid cyst. 2.  Large multilocular right adnexal cyst. This has low risk factor for malignancy (about 5%). Differential diagnosis of benign ovarian neoplasm should strongly be considered.   2024 TVUS ([facility]) Uterus: 7.16 x 4.1 x 4.01 cm Endometrium: 8.48 mm Right ovary: 4.53 x 4.18 x 4.89 cm Left ovary: 10.97 x 9.92 x 10.7 cm  2023 [facility]US ([facility]) Uterus: 7.39 x 4.23 x 3.55 cm Endometrium: 5.67 mm Left ovary: 3.05 x 1.90 x 1.75 cm Right ovary: 7.04 x 4.62 x 4.92 cm Impression: Right ovary seen with a simple cyst measuring 4.52 x 3.84 x 4.05 cm Right ovary seen with suspected dermoid cyst measuring 3.46 x 1.47 x 2.52 cm  21 TVUS: Uterus:   7.5 x 3.9 x 4.0 cm  Endometrium:  6mm Right ovary: 4.2 cm x 5.9 cm x 3.9 cm. Dominant 3.3 x 3.7 x 2.9 cm cyst, previously 4.0 x 3.4 x 3.1 cm. Bloodflow is documented in the right ovary. Left ovary: 2.2 cm x 3.1 cm x 1.8 cm. Within normal limits. Blood flow is documented in the left ovary. Fluid: None. IMPRESSION: Dominant right ovarian cyst smaller than prior. Recommend continued follow-up to ensure complete resolution.  Interim History:  Started on OCP's  --> had episodes of irregular bleeding.    Pertinent History: Surgery previously scheduled by Gyn for , then cancelled as her tumor markers had been still pending. Patient then had repeat imaging in  and referred to gyn onc due to complexity of the cyst.  LEEP Pathology 3/13/2020: 1)  Ectocervix, LEEP -  HSIL (moderate to severe dysplasia, NISREEN 2-3/3 -  Surgical margin is positive for LSIL at the endocervical side at 8-10 o'clock location.   -  The rest of the surgical margin is free of dysplasia. 2)  Endocervix, curettage: -  Fragments of benign endocervical tissue.   PAP / Colposcopy / ECC: 3/9/2020  -  LSIL + HPV -  ECC:  Benign endocervical mucosa showing mild acute and chronic inflammation with focal squamous metaplasia and microglandular hyperplasia.  No evidence of dysplasia.  -  Cervix 12 o'clock:  Cervix tissue, including T zone, showing moderate dysplasia (HSIL / NISREEN 2, with co-existing mild dysplasia (LSIL / NISREEN 1).  3/4/21 Pathology ([facility]) - EMB:  Inactive endometrium with features suggestive of exogenous hormone effect.    Labs: : 9 (10/23/2020) --> 6 (10/1/2020) HE4:  23 (10/23/2020) AFP:  2.9 (10/1/2020) Inhibin A:  5 (10/1/2020) Inhibin B:  43 (10/1/2020)  Paps: 2020: Negative for intraepithelial lesion or malignancy; - HPV mRNA (bio reference labs)

## 2024-07-30 NOTE — DISCUSSION/SUMMARY
[Reviewed Clinical Lab Test(s)] : Results of clinical tests were reviewed. [Reviewed Radiology Report(s)] : Radiology reports were reviewed. [Reviewed Radiology Film/Image(s)] : Images from radiology studies were reviewed and examined. [FreeTextEntry1] : 35yo with enlarged multiloculated right ovarian cyst and small left ovarian cyst (possibly dermoid).   Patient is nulliparous and desires to preserve fertility  Discussed differential diagnosis and low suspicion overall for malignancy but that diagnosis will ultimately depend on final pathologic evaluation. Discussed recommendation for surgical management, will plan for minimally invasive approach with RA-Lsc ovarian cystectomy, possible bilateral ovarian cystectomy, possible oophorectomy, possible exlap. , all indicated procedures. Repeat TVUS  F/u Pap, Cervical biopsy path  PST  Torsion precautions reviewed  Plan for OR in Sept 9/13 [facility] pst only

## 2024-07-30 NOTE — PHYSICAL EXAM
[Fully active, able to carry on all pre-disease performance without restriction] : Status 0 - Fully active, able to carry on all pre-disease performance without restriction [Chaperone Present] : A chaperone was present in the examining room during all aspects of the physical examination [84805] : A chaperone was present during the pelvic exam. [Abnormal] : Adnexa(ae): Abnormal [Normal] : Parametria: Normal [de-identified] : no lesions or nodules [de-identified] : Lesion noted at 2 oclock, biopsied.  [de-identified] : Large mass noted to the level of the umbilicus  [de-identified] : smooth rectovag septum, no nodularity

## 2024-07-30 NOTE — DISCUSSION/SUMMARY
[Reviewed Clinical Lab Test(s)] : Results of clinical tests were reviewed. [Reviewed Radiology Report(s)] : Radiology reports were reviewed. [Reviewed Radiology Film/Image(s)] : Images from radiology studies were reviewed and examined. [FreeTextEntry1] : 33yo with enlarged multiloculated right ovarian cyst and small left ovarian cyst (possibly dermoid).   Patient is nulliparous and desires to preserve fertility  Discussed differential diagnosis and low suspicion overall for malignancy but that diagnosis will ultimately depend on final pathologic evaluation. Discussed recommendation for surgical management, will plan for minimally invasive approach with RA-Lsc ovarian cystectomy, possible bilateral ovarian cystectomy, possible oophorectomy, possible exlap. , all indicated procedures. Repeat TVUS  F/u Pap, Cervical biopsy path  PST  Torsion precautions reviewed  Plan for OR in Sept 9/13 [facility] pst only

## 2024-07-30 NOTE — PROCEDURE
[Cervical Biopsy] : a cervical biopsy [Other: ___] : [unfilled] [Betadine] : betadine [Silver Nitrate] : silver nitrate

## 2024-07-30 NOTE — HISTORY OF PRESENT ILLNESS
[FreeTextEntry1] : GYN/Ref:  Flavia Gallegos MD  Ms. Crow is a 34 years old presenting for history of a right ovarian cyst; endometrial polyp and h/o HSIL / NISREEN II-III .  She was last seen 2021 and advised to follow-up with GYN as there was a stable ovarian cyst or follow-up Pap.  She presents today with an enlarged ovarian cyst.   She reports she had followed regularly with a gynecologist for ultrasounds over which time the cyst appeared stable. She started seeing another gynecologist to discuss concerns about increasing acne, in the course of which time she estimates it had been about a year between surveillance ultrasounds. When she went for repeat imaging she was told she had a large cyst on the right and a new smaller cyst on the left (possibly dermoid).  She reports persistent bloating and a sense of incomplete evacuations with bowel movements but otherwise denies abdominal pain, N/V, difficulty voiding.   Reports she thinks she is up to date with Paps and last Pap was normal but wishes to have repeat Pap performed today.    Most recent imagin2024 MRI pelvis ([facility]) UTERUS: The uterus is anteverted measuring 8.8 x 4.4 x 4.7 cm (craniocaudal by AP by transverse). There is no discrete fibroid. The endometrial stripe signal intensity thickness is within normal limits measuring approximately 0.5 cm. The junctional zone shows no significant thickening. OVARIES/ADNEXA: Arising from the left ovary is a fat-containing cyst measuring 2.3 x 2 x 1.9 cm diagnostic of a dermoid cyst. There is also a corpus luteum arising from the left ovary. This is new since the prior study. Occupying most of the right anterior aspect of the pelvis and extending into the lower abdomen to the level of the umbilicus is a large multilocular cystic lesion measuring approximately 12.2 x 9.6 x 14 cm. It shows thin septations with enhancement. On the prior examination, there was a cyst measuring approximately 4 cm in diameter. URINARY BLADDER: The bladder shows minimal distention without filling defect or wall thickening. ASCITES: There is no ascites. BONY STRUCTURES: Intact. MAJOR PELVIC VESSELS: Patent. OTHER FINDINGS: N/A. IMPRESSION: 1.  Left ovarian dermoid cyst. 2.  Large multilocular right adnexal cyst. This has low risk factor for malignancy (about 5%). Differential diagnosis of benign ovarian neoplasm should strongly be considered.   2024 TVUS ([facility]) Uterus: 7.16 x 4.1 x 4.01 cm Endometrium: 8.48 mm Right ovary: 4.53 x 4.18 x 4.89 cm Left ovary: 10.97 x 9.92 x 10.7 cm  2023 [facility]US ([facility]) Uterus: 7.39 x 4.23 x 3.55 cm Endometrium: 5.67 mm Left ovary: 3.05 x 1.90 x 1.75 cm Right ovary: 7.04 x 4.62 x 4.92 cm Impression: Right ovary seen with a simple cyst measuring 4.52 x 3.84 x 4.05 cm Right ovary seen with suspected dermoid cyst measuring 3.46 x 1.47 x 2.52 cm  21 TVUS: Uterus:   7.5 x 3.9 x 4.0 cm  Endometrium:  6mm Right ovary: 4.2 cm x 5.9 cm x 3.9 cm. Dominant 3.3 x 3.7 x 2.9 cm cyst, previously 4.0 x 3.4 x 3.1 cm. Bloodflow is documented in the right ovary. Left ovary: 2.2 cm x 3.1 cm x 1.8 cm. Within normal limits. Blood flow is documented in the left ovary. Fluid: None. IMPRESSION: Dominant right ovarian cyst smaller than prior. Recommend continued follow-up to ensure complete resolution.  Interim History:  Started on OCP's  --> had episodes of irregular bleeding.    Pertinent History: Surgery previously scheduled by Gyn for , then cancelled as her tumor markers had been still pending. Patient then had repeat imaging in  and referred to gyn onc due to complexity of the cyst.  LEEP Pathology 3/13/2020: 1)  Ectocervix, LEEP -  HSIL (moderate to severe dysplasia, NISREEN 2-3/3 -  Surgical margin is positive for LSIL at the endocervical side at 8-10 o'clock location.   -  The rest of the surgical margin is free of dysplasia. 2)  Endocervix, curettage: -  Fragments of benign endocervical tissue.   PAP / Colposcopy / ECC: 3/9/2020  -  LSIL + HPV -  ECC:  Benign endocervical mucosa showing mild acute and chronic inflammation with focal squamous metaplasia and microglandular hyperplasia.  No evidence of dysplasia.  -  Cervix 12 o'clock:  Cervix tissue, including T zone, showing moderate dysplasia (HSIL / NISREEN 2, with co-existing mild dysplasia (LSIL / NISREEN 1).  3/4/21 Pathology ([facility]) - EMB:  Inactive endometrium with features suggestive of exogenous hormone effect.    Labs: : 9 (10/23/2020) --> 6 (10/1/2020) HE4:  23 (10/23/2020) AFP:  2.9 (10/1/2020) Inhibin A:  5 (10/1/2020) Inhibin B:  43 (10/1/2020)  Paps: 2020: Negative for intraepithelial lesion or malignancy; - HPV mRNA (bio reference labs)

## 2024-08-05 LAB — CORE LAB BIOPSY: NORMAL

## 2024-08-29 ENCOUNTER — OUTPATIENT (OUTPATIENT)
Dept: OUTPATIENT SERVICES | Facility: HOSPITAL | Age: 34
LOS: 1 days | End: 2024-08-29
Payer: COMMERCIAL

## 2024-08-29 VITALS
HEIGHT: 63 IN | TEMPERATURE: 98 F | WEIGHT: 154.98 LBS | RESPIRATION RATE: 18 BRPM | HEART RATE: 69 BPM | SYSTOLIC BLOOD PRESSURE: 113 MMHG | OXYGEN SATURATION: 100 % | DIASTOLIC BLOOD PRESSURE: 77 MMHG

## 2024-08-29 DIAGNOSIS — Z98.890 OTHER SPECIFIED POSTPROCEDURAL STATES: Chronic | ICD-10-CM

## 2024-08-29 DIAGNOSIS — N83.299 OTHER OVARIAN CYST, UNSPECIFIED SIDE: ICD-10-CM

## 2024-08-29 DIAGNOSIS — N83.209 UNSPECIFIED OVARIAN CYST, UNSPECIFIED SIDE: ICD-10-CM

## 2024-08-29 DIAGNOSIS — Z90.3 ACQUIRED ABSENCE OF STOMACH [PART OF]: Chronic | ICD-10-CM

## 2024-08-29 DIAGNOSIS — Z01.818 ENCOUNTER FOR OTHER PREPROCEDURAL EXAMINATION: ICD-10-CM

## 2024-08-29 LAB
ALBUMIN SERPL ELPH-MCNC: 3.6 G/DL — SIGNIFICANT CHANGE UP (ref 3.5–5)
ALP SERPL-CCNC: 74 U/L — SIGNIFICANT CHANGE UP (ref 40–120)
ALT FLD-CCNC: 24 U/L DA — SIGNIFICANT CHANGE UP (ref 10–60)
ANION GAP SERPL CALC-SCNC: 6 MMOL/L — SIGNIFICANT CHANGE UP (ref 5–17)
APTT BLD: 26.9 SEC — SIGNIFICANT CHANGE UP (ref 24.5–35.6)
AST SERPL-CCNC: 13 U/L — SIGNIFICANT CHANGE UP (ref 10–40)
BILIRUB SERPL-MCNC: 0.2 MG/DL — SIGNIFICANT CHANGE UP (ref 0.2–1.2)
BLD GP AB SCN SERPL QL: SIGNIFICANT CHANGE UP
BUN SERPL-MCNC: 13 MG/DL — SIGNIFICANT CHANGE UP (ref 7–18)
CALCIUM SERPL-MCNC: 9.3 MG/DL — SIGNIFICANT CHANGE UP (ref 8.4–10.5)
CHLORIDE SERPL-SCNC: 112 MMOL/L — HIGH (ref 96–108)
CO2 SERPL-SCNC: 21 MMOL/L — LOW (ref 22–31)
CREAT SERPL-MCNC: 0.64 MG/DL — SIGNIFICANT CHANGE UP (ref 0.5–1.3)
EGFR: 118 ML/MIN/1.73M2 — SIGNIFICANT CHANGE UP
GLUCOSE SERPL-MCNC: 89 MG/DL — SIGNIFICANT CHANGE UP (ref 70–99)
HCT VFR BLD CALC: 38 % — SIGNIFICANT CHANGE UP (ref 34.5–45)
HGB BLD-MCNC: 12.3 G/DL — SIGNIFICANT CHANGE UP (ref 11.5–15.5)
INR BLD: 1 RATIO — SIGNIFICANT CHANGE UP (ref 0.85–1.18)
MCHC RBC-ENTMCNC: 29 PG — SIGNIFICANT CHANGE UP (ref 27–34)
MCHC RBC-ENTMCNC: 32.4 GM/DL — SIGNIFICANT CHANGE UP (ref 32–36)
MCV RBC AUTO: 89.6 FL — SIGNIFICANT CHANGE UP (ref 80–100)
NRBC # BLD: 0 /100 WBCS — SIGNIFICANT CHANGE UP (ref 0–0)
PLATELET # BLD AUTO: 348 K/UL — SIGNIFICANT CHANGE UP (ref 150–400)
POTASSIUM SERPL-MCNC: 3.8 MMOL/L — SIGNIFICANT CHANGE UP (ref 3.5–5.3)
POTASSIUM SERPL-SCNC: 3.8 MMOL/L — SIGNIFICANT CHANGE UP (ref 3.5–5.3)
PROT SERPL-MCNC: 7.2 G/DL — SIGNIFICANT CHANGE UP (ref 6–8.3)
PROTHROM AB SERPL-ACNC: 11.4 SEC — SIGNIFICANT CHANGE UP (ref 9.5–13)
RBC # BLD: 4.24 M/UL — SIGNIFICANT CHANGE UP (ref 3.8–5.2)
RBC # FLD: 12.9 % — SIGNIFICANT CHANGE UP (ref 10.3–14.5)
SODIUM SERPL-SCNC: 139 MMOL/L — SIGNIFICANT CHANGE UP (ref 135–145)
WBC # BLD: 12.5 K/UL — HIGH (ref 3.8–10.5)
WBC # FLD AUTO: 12.5 K/UL — HIGH (ref 3.8–10.5)

## 2024-08-29 RX ORDER — ISOTRETINOIN 30 MG/1
1 CAPSULE ORAL
Refills: 0 | DISCHARGE

## 2024-08-29 NOTE — H&P PST ADULT - PROBLEM SELECTOR PLAN 1
Pt is scheduled for examination under anesthesia, robotic assisted unilateral ovarian cystectomy possible unilateral [id number]-hysdmdajnnxa-qmxr to be determined in the OR on 9/13/24.  ARLEN stop bang score 0. Pt denies history of ARLEN, never did sleep study.   Preoperative instructions discussed with pt and given to pt.   Instructed pt that she will need someone to escort her home after surgery, to notify security when she arrives in the lobby of the hospital that she is here for surgery, not to eat or drink anything after midnight the night before the surgery, to avoid NSAIDs such as Ibuprofen, Motrin, Aleve, Advil, Naproxen before surgery, to take Tylenol if needed for pain, to report if she has been exposed to any one with any contagious diseases including Covid-19 or if she is exhibiting any symptoms of COVID-19.   Instructed about use of Chlorhexidine 4% soap for 3 days before surgery including the morning of the surgery to prevent infection. Verbalized understanding of instructions given.

## 2024-08-29 NOTE — H&P PST ADULT - NSICDXPASTSURGICALHX_GEN_ALL_CORE_FT
PAST SURGICAL HISTORY:  H/O eye surgery RIGHT Eye Surgery August 2019    H/O gastric sleeve     H/O LEEP 3/20    H/O ovarian cystectomy 2017

## 2024-08-29 NOTE — H&P PST ADULT - NS PRO FEM  PAP SMEARS 3YRS
Reason for the visit:   Chief Complaint   Patient presents with    Follow-up     polycythemia       Pertinent Clinical Problems/ Treatments:  1. Polycythemia, initially diagnosed with polycythemia vera, but further workup suggested other possibilities,  2. Diagnosed with pulmonary hypertension, possibly explaining her symptoms and elevated hematocrit  3. Treated with periodic phlebotomies and aspirin as the phlebotomies helped her symptoms    Summary of the case/HPI :    She is 58-year-old patient who was diagnosed in 2004 with polycythemia vera, she was symptomatically the time in terms of headache and fatigue. She has been treated with therapeutic phlebotomies as well as aspirin since that time. Patient did not have any major events like thrombosis or stroke and did not qualify for cytoreductive therapy like hydroxyurea  She has been doing relatively well with the phlebotomy every couple of months to keep hematocrit is than 45  Further workup showed negative Wilhelmenia Golds 2 mutation and normal erythropoietin. Pulmonary workup suggested pulmonary hypertension. We organized sleep study and she didn't do it yet to exclude obstructive sleep apnea although this is suspected clinically. The patient phlebotomies helped significantly so we arranged for periodic phlebotomies when her hematocrit is above 45. Interim HX:    Patient is here today for follow-up visit for polycythemia. Patient has been doing phlebotomy every 4 months. She is clinically stable. She feels better after the phlebotomy with less headaches. No dizziness. No TIA or CVA-like symptoms. Patient had no cardiac events. At the present time she feels fine with no symptoms.        Past Medical History   has a past medical history of Activity intolerance, Allergic rhinitis due to other allergen, Depressive disorder, not elsewhere classified, Diabetes ([address] Diverticulosis, H/O echocardiogram, Hypertension, LAYO (obstructive sleep apnea), Other and focal findings or movement disorder noted   Musculoskeletal - no joint tenderness, deformity or swelling   Extremities - peripheral pulses normal, no pedal edema, no clubbing or cyanosis   Skin - normal coloration and turgor, no rashes, no suspicious skin lesions noted          Lab Results   Component Value Date    WBC 6.8 09/30/2021    HGB 15.4 09/30/2021    HCT 47.4 (H) 09/30/2021    MCV 87 09/30/2021     09/30/2021       Chemistry        Component Value Date/Time     08/03/2021 1040    K 4.1 08/03/2021 1040     08/03/2021 1040    CO2 26 08/03/2021 1040    BUN 21 08/03/2021 1040    CREATININE 0.50 08/03/2021 1040        Component Value Date/Time    CALCIUM 9.6 08/03/2021 1040    ALKPHOS 63 08/03/2021 1040    AST 20 08/03/2021 1040    ALT 24 08/03/2021 1040    BILITOT 1.0 08/03/2021 1040    BILITOT NEGATIVE 05/06/2019 0801        DARRIUS 2 is negative  erythropoietin level is 14   Previous records were reviewed, her erythropoietin was never suppressed. Assessment:    1. Polycythemia, the patient diagnoses of polycythemia vera is in question. Darrius 2 mutation was negative. I think it's more likely related to pulmonary hypertension possible sleep apnea   2. The patient is managed with periodic phlebotomies to help with her symptoms   3. Type 2 diabetes, hypertension, under fair control        Plan:     1. Lab tests done outside facility were reviewed and discussed. Patient feels better after therapeutic phlebotomy. Labs are showing hemoglobin maintained at the upper limit of normal.  So my recommendation is to continue with the same schedule with phlebotomy every 4 months. 2. I'm not convinced that she has polycythemia vera. But she is insisting on the phlebotomies because the to help her symptoms with headache and fatigue   3. We will keep hematocrit around 45  4. she continues to have follow-up with pulmonary and having CPAP. 5. Continue full dose aspirin  6.  We will see her back in 1 year for a follow-up. CBC will be done every 4 months. Patient will be seen sooner if she develops any problems.                               [address] [facility] 7/2024/yes

## 2024-08-29 NOTE — H&P PST ADULT - HISTORY OF COVID-19 VACCINATION
Eri Estrada Patient Age: 56 year old  MESSAGE: Interpreting service used: No    Insurance on file confirmed with caller: No- pt had to get off phone    IM/FP- per pt sometime within the past year she discussed weight loss med with doctor and wants to discuss again..   per pt she is only available to talk after 4p any day.    Message read back to caller for accuracy: Yes       ALLERGIES:  Gluten meal   (food or med), Soybean allergy   (food or med), Flax seed   (food or med), Naproxen, Sunflower oil   (food or med), and Onion   (food or med)  Current Outpatient Medications   Medication Sig Dispense Refill    traMADol HCl 100 MG Tab Take 100 mg by mouth every 12 hours as needed (pain). 30 tablet 3    estradiol (ESTRACE) 0.1 MG/GM vaginal cream Place 1 g vaginally daily. For 7 days.  Then decrease use to 1-2 times per week at bedtime. 42.5 g 1    tramadol-acetaminophen (Ultracet) 37.5-325 MG per tablet Take 1 tablet by mouth every 6 hours as needed for Pain. 30 tablet 0    ECHINACEA COMPLEX PO       BLACK WALNUT POLLEN SC       Probiotic Product (ACIDOPHILUS/GOAT MILK PO)       Acetaminophen (TYLENOL PO)        No current facility-administered medications for this visit.     PHARMACY to use: unknown          Pharmacy preference(s) on file:   [facility] [address] [place] [address] - [address]  [address] [facility]  [facility] [address]  Phone: [phone] Fax: [phone]      CALL BACK INFO: Ok to leave response (including medical information) on answering machine      PCP: Tarun Wright MD         INS: Payor: ZENAIDA/MICHELLE / Plan: [id number] / Product Type: PPO MISC   PATIENT ADDRESS:  [address] [place] [address]   [place] [address]     Yes

## 2024-08-29 NOTE — H&P PST ADULT - GASTROINTESTINAL
normal/soft/nontender/nondistended/normal active bowel sounds details… normal/soft/nontender/nondistended/normal active bowel sounds/mass palpable

## 2024-08-29 NOTE — H&P PST ADULT - NSICDXFAMILYHX_GEN_ALL_CORE_FT
FAMILY HISTORY:  FH: type 2 diabetes, mother borderline    Father  Still living? Yes, Estimated age: Age Unknown  Family history of hypertension, Age at diagnosis: Age Unknown    Mother  Still living? Yes, Estimated age: 41-50  Hypertension, Age at diagnosis: Age Unknown

## 2024-08-29 NOTE — H&P PST ADULT - ASSESSMENT
This is a 34 year olf Ukrainian with PMH of  facial acne, GERD, ARLEN resolved after bariatric surgery, COVID-19 virus infection in 2022-fully recovered, morbid obesity-s/p gastric sleeve on 6/8/23 who presents with ovarian cyst. Pt is scheduled for examination under anesthesia, robotic assisted unilateral ovarian cystectomy possible unilateral rfmfnpzu-vbinnstjygxm-mity to be determined in the OR on 9/13/24.  ARLEN stop bang score 0. Pt denies history of ARLEN, never did sleep study.

## 2024-08-29 NOTE — H&P PST ADULT - ATTENDING COMMENTS
plan for EUA, RA unilateral cystectomy, possible USO, Hysterectomy, BSO  all indicate dprocedures, possible open surgery  discussed risks including bleeding, infection, injury to bowel/bladder/vessels/nerves/ureters, risks of blood transfusion, reoperation, ICU admission

## 2024-08-29 NOTE — H&P PST ADULT - HISTORY OF PRESENT ILLNESS
This is a 34 year olf [place]ian with PMH of COVID-19 virus infection in 2022-fully recovered, morbid obesity-s/p gastric sleeve on 6/8/23 who presents with c/o right ovarian cyst. Pt is scheduled for examination under anesthesia, robotic assisted unilateral ovarian cystectomy possible unilateral tguhhryp-xjmfwtkyesiv-utee to be determined  This is a 34 year olf American with PMH of  facial acne, GERD, ARLEN resolved after bariatric surgery, COVID-19 virus infection in 2022-fully recovered, morbid obesity-s/p gastric sleeve on 6/8/23 who presents with c/o right ovarian cyst. Pt is scheduled for examination under anesthesia, robotic assisted unilateral ovarian cystectomy possible unilateral yefuvvrk-ilxexzknkywi-otvy to be determined in the OR on 9/13/24.

## 2024-08-29 NOTE — H&P PST ADULT - NSICDXPROCEDURE_GEN_ALL_CORE_FT
PROCEDURES:  Pelvic examination under anesthesia 29-Aug-2024 17:53:36  Arabella Parham  Robot-assisted laparoscopic ovarian cystectomy 29-Aug-2024 17:54:17  Arabella Parham  Salpingo-oophorectomy, robot-assisted 29-Aug-2024 17:54:43  Arabella Parham

## 2024-08-29 NOTE — H&P PST ADULT - PAIN SCORE
"    CC: follow up prostate cancer    History of Present Illness:   Ezequiel Hughes is a 85 y.o. male here for evaluation of Follow-up (3 month f/u)    5/10/24-PSA up a little. Pt continues to take  finasteride. States that his urination is "terrible". He has nocturia x 7-8. He is scared to take vesicare. Sometimes feels like he doesn't empty. He does have GUIDO, but doesn't use his C-pap.     2/7/24-Pt currently in PT for his back. LUTS have been better. Nocturia x 2 last night. Emptying better. No hesitancy or stranguria. No gross hematuria. Slight UUI at times. He is not taking vesicare yet, but wants to start. States that he does have it.     11/21/23-IPSS 24, QoL 5 (unhappy). Nocutria x 4. He is currently on alfuzosin and finasteride but is out of the solifenacin. Took it for 1 month and it didn't help. Would like to try something stronger. Primary bother is urgency. No stranguria or difficulty emptying. Nocturia x 4.   8/11/23-Pt on proscar. He quit detrol because he wet the bed. Nocturia x 2-6. Stream is not always good. Hasn't tried Viagra yet. Was in the ED the other day with chest pain and had a UA, which showed 1+ blood, but no RBCs. Wearing his C-pap.   5/5/23-Pt reports that he is on finasteride, but isn't taking alfuzosin. Nocturia x 5-6. He has a little slight "leakage" throughout the day. He does have urgency. Didn't have any improvement with alfuzosin.   9/28/22-On finasteride. Nocturia x 4-5. Tried flomax a long time ago and it didn't help. Drinks 2 cups of coffee in the am. Not drinking about an hour before bed. No gross hematuria.   6/30/22-Nocturia x 1 lately, but prior to the last 2 nights, it has been 4 times. Still on finasteride. He does have urgency and occasional UUI. If he goes out, he wears a pad. Stream is weak. No hesitancy. Recently, visits have gone to every 6 months. Pt reports occasional RLQ pain. He does have come constipation, but pain doesn't change with BM. Persistent for a month.  "   Prior records indicate last MRI and TRUS biopsy in 2020.   3/29/22- 82yo male with h/o Prostate cancer, here to establish care. He has previously seen Dr. Wong and was undergoing active surveillance. He reports that he was diagnosed with prostate cancer in 2014. He hasn't had any treatment. He is currently managed on finasteride. He does report some UUI, small amounts. He had a repeat TRUS biopsy in 2021, and pt reports path was unchanged. Also had MRIs around that time as well and states that he had a targetable lesion.         Review of Systems   Respiratory:  Negative for shortness of breath.    Cardiovascular:  Negative for chest pain and palpitations.   Genitourinary:  Negative for hematuria.   Musculoskeletal:  Positive for back pain and neck pain.   Neurological:  Positive for numbness (legs). Negative for dizziness.   All other systems reviewed and are negative.        Past Medical History:   Diagnosis Date    Allergic rhinitis     Anemia     Back pain     BPH (benign prostatic hyperplasia)     Cancer     skin cancer to neck, Dr. Graves    Cataract     Disorder of kidney and ureter     ED (erectile dysfunction)     OMARI (generalized anxiety disorder) 8/7/2023    Hiatal hernia     small    History of colon polyps     colonoscopy 11/2016    HLD (hyperlipidemia)     Hyperlipidemia     Hypertension     Lateral epicondylitis     Lumbar radiculopathy 1/26/2022    OA (osteoarthritis)     GUIDO (obstructive sleep apnea)     Prostate cancer     Dr. Wong    TIA (transient ischemic attack)     Trouble in sleeping     Urge incontinence 3/29/2022       Past Surgical History:   Procedure Laterality Date    ANGIOGRAPHY OF UPPER EXTREMITY Left 07/05/2022    Procedure: Angiogram Extremity Bilateral;  Surgeon: Aparna Thompson MD;  Location: [facility] CATH LAB;  Service: Cardiology;  Laterality: Left;    ARTERIOGRAPHY OF AORTIC ROOT N/A 07/05/2022    Procedure: ARTERIOGRAM, AORTIC ROOT;  Surgeon: Aparna Thompson MD;   Location: [facility] CATH LAB;  Service: Cardiology;  Laterality: N/A;    CATARACT EXTRACTION W/  INTRAOCULAR LENS IMPLANT Right 02/21/2018    I-Stent    CATARACT EXTRACTION W/  INTRAOCULAR LENS IMPLANT Left 03/21/2018    I - Stent    CATHETERIZATION OF BOTH LEFT AND RIGHT HEART N/A 07/05/2022    Procedure: CATHETERIZATION, HEART, BOTH LEFT AND RIGHT;  Surgeon: Aparna Thompson MD;  Location: [facility] CATH LAB;  Service: Cardiology;  Laterality: N/A;  radial approach    COLONOSCOPY N/A 11/14/2016    Procedure: COLONOSCOPY;  Surgeon: Karuna Rodriguez MD;  Location: [facility] ENDO;  Service: Endoscopy;  Laterality: N/A;    COLONOSCOPY N/A 09/22/2020    Procedure: COLONOSCOPY;  Surgeon: Chuy Fish MD;  Location: [facility] ENDO;  Service: Endoscopy;  Laterality: N/A;    EPIDURAL STEROID INJECTION INTO CERVICAL SPINE N/A 2/8/2024    Procedure: C5/6 IL Right paramedian approach IAN with RN IV sedation;  Surgeon: Abel Haywood MD;  Location: [facility] PAIN MGT;  Service: Pain Management;  Laterality: N/A;    EYE SURGERY      HEMORRHOID SURGERY      I-STENT Right 02/21/2018    DR. REECE    INJECTION OF ANESTHETIC AGENT AROUND MEDIAL BRANCH NERVES INNERVATING CERVICAL FACET JOINT Bilateral 7/18/2023    Procedure: Bilateral C4-6 MBB with RN IV sedation (diagnostic, #1);  Surgeon: Abel Haywood MD;  Location: [facility] PAIN MGT;  Service: Pain Management;  Laterality: Bilateral;    KNEE ARTHROSCOPY W/ MENISCAL REPAIR Right 2015    Dr. Jain    PCIOL Right 02/21/2018    DR. REECE    PLANTAR FASCIA RELEASE      right    ROTATOR CUFF REPAIR Bilateral     bilateral    SELECTIVE INJECTION OF ANESTHETIC AGENT AROUND LUMBAR SPINAL NERVE ROOT BY TRANSFORAMINAL APPROACH Bilateral 01/26/2022    Procedure: Bilateral L4/5 TF IAN with RN IV sedation;  Surgeon: Mak Young MD;  Location: [facility] PAIN MGT;  Service: Pain Management;  Laterality: Bilateral;    SELECTIVE INJECTION OF ANESTHETIC AGENT AROUND LUMBAR SPINAL NERVE ROOT BY TRANSFORAMINAL  APPROACH Bilateral 03/14/2022    Procedure: Bilateral L4/5 TF IAN with RN IV sedation;  Surgeon: Mak Young MD;  Location: [facility] PAIN MGT;  Service: Pain Management;  Laterality: Bilateral;    SELECTIVE INJECTION OF ANESTHETIC AGENT AROUND LUMBAR SPINAL NERVE ROOT BY TRANSFORAMINAL APPROACH Bilateral 06/02/2022    Procedure: Bilateral L4/5 TF IAN - D2P Dr. Castro [facility];  Surgeon: Abel Haywood MD;  Location: [facility] PAIN MGT;  Service: Pain Management;  Laterality: Bilateral;    SELECTIVE INJECTION OF ANESTHETIC AGENT AROUND LUMBAR SPINAL NERVE ROOT BY TRANSFORAMINAL APPROACH Bilateral 08/25/2022    Procedure: Bilateral L4/5 TF IAN;  Surgeon: Abel Haywood MD;  Location: [facility] PAIN MGT;  Service: Pain Management;  Laterality: Bilateral;    SELECTIVE INJECTION OF ANESTHETIC AGENT AROUND LUMBAR SPINAL NERVE ROOT BY TRANSFORAMINAL APPROACH Bilateral 6/29/2023    Procedure: Bilateral L4/5 TF IAN RN IV Sedation;  Surgeon: Abel Haywood MD;  Location: [facility] PAIN MGT;  Service: Pain Management;  Laterality: Bilateral;    SELECTIVE INJECTION OF ANESTHETIC AGENT AROUND LUMBAR SPINAL NERVE ROOT BY TRANSFORAMINAL APPROACH Bilateral 4/11/2024    Procedure: Bilateral L4/5 TF IAN;  Surgeon: Abel Haywood MD;  Location: [facility] PAIN MGT;  Service: Pain Management;  Laterality: Bilateral;    SHOULDER SURGERY Bilateral around 2000    Dr. Pepper.  rotator cuff surgeries    VASECTOMY         Family History   Problem Relation Name Age of Onset    Lung cancer Father Isaiah Hughes         life long smoker    Cancer Father Isaiah Hughes         prostate, lung    Arthritis Mother Emely Hughes     Stroke Sister          TIA    Cataracts Sister      Cancer Brother          prostate    Cataracts Brother      Cataracts Sister      Melanoma Neg Hx      Psoriasis Neg Hx      Lupus Neg Hx      Eczema Neg Hx      Diabetes Neg Hx      Heart disease Neg Hx      Kidney disease Neg Hx      Colon cancer Neg Hx         Social History     Tobacco  Use    Smoking status: Former     Current packs/day: 0.00     Average packs/day: 3.0 packs/day for 35.0 years (104.9 ttl pk-yrs)     Types: Cigarettes     Start date: 1960     Quit date: 1985     Years since quittin.8     Passive exposure: Past    Smokeless tobacco: Never   Substance Use Topics    Alcohol use: Yes     Alcohol/week: 3.0 standard drinks of alcohol     Types: 3 Cans of beer per week     Comment: socially    Drug use: No       Current Outpatient Medications   Medication Sig Dispense Refill    acetaminophen (TYLENOL ARTHRITIS PAIN ORAL) Take by mouth. Patient states that he takes 2 tablets in the morning and 2 tablets in the evening      albuterol (PROVENTIL/VENTOLIN HFA) 90 mcg/actuation inhaler Inhale 2 puffs into the lungs every 4 (four) hours as needed for Wheezing or Shortness of Breath. 8.7 g 1    alfuzosin (UROXATRAL) 10 mg Tb24 Take 10 mg by mouth daily with breakfast.      amLODIPine (NORVASC) 5 MG tablet Take 1 tablet (5 mg total) by mouth 2 (two) times daily. 180 tablet 3    atorvastatin (LIPITOR) 40 MG tablet TAKE 1 TABLET EVERY DAY 90 tablet 3    citalopram (CELEXA) 10 MG tablet Take 1 tablet (10 mg total) by mouth once daily. For anxiety 90 tablet 3    clopidogreL (PLAVIX) 75 mg tablet TAKE 1 TABLET EVERY DAY 90 tablet 2    cyclobenzaprine (FLEXERIL) 5 MG tablet 1 tablet ever 8 hours PRN muscle stiffness/spasms Orally Three times a day for 5 days      finasteride (PROSCAR) 5 mg tablet Take 1 tablet (5 mg total) by mouth once daily. 90 tablet 4    fluticasone propionate (FLONASE) 50 mcg/actuation nasal spray 2 sprays (100 mcg total) by Each Nostril route Daily. 48 g 3    furosemide (LASIX) 20 MG tablet Take 1 tablet (20 mg total) by mouth daily as needed (edema). 30 tablet 0    losartan (COZAAR) 50 MG tablet TAKE 1 TABLET TWICE DAILY 180 tablet 3    pantoprazole (PROTONIX) 40 MG tablet TAKE 1 TABLET EVERY DAY 90 tablet 3    pregabalin (LYRICA) 75 MG capsule Take 1 tablet by  mouth daily at bedtime x 1 week, then take 1 tablet twice a day by mouth 60 capsule 2    sildenafiL (VIAGRA) 100 MG tablet Take 1 po 45 minutes before intercourse 30 tablet 7    solifenacin (VESICARE) 5 MG tablet Take 2 tablets (10 mg total) by mouth once daily. 90 tablet 0     No current facility-administered medications for this visit.       Review of patient's allergies indicates:   Allergen Reactions    Atarax [hydroxyzine hcl] Other (See Comments)     Raised blood pressure     Zyrtec [cetirizine] Other (See Comments)     Raised blood pressure     Gold sodium thiosulfate      Patch test positive    Meloxicam Rash     Other reaction(s): hypertension       Physical Exam  Vitals:    05/10/24 0759   BP: (!) 152/64   Pulse: 75   Resp: 18         General: Well-developed, well-nourished in no acute distress  HEENT: Normocephalic, atraumatic, Extraocular movements intact  Neck: supple, trachea midline, no cervical or supraclavicular lymphadenopathy  Respirations: even and unlabored  Rectal: 11/21/23->40g prostate, no nodules or tenderness. No gross blood  Extremities: atraumatic, moves all equally, no clubbing, cyanosis or edema  Psych: normal affect  Skin: warm and dry, no lesions  Neuro: Alert and oriented, Cranial nerves II-XII grossly intact    PVR: 29cc 5/3/23    UA: negative for blood, LE, nit    PSA  7/7/21: 1.3  3/23/22: 1.5  6/16/22: 1.4  9/26/22: 1.5  12/27/22: 1.9  2/9/23: 1.3  8/3/23: 2.0  11/14/23: 1.4  2/6/24: 1.6  5/3/24: 2.8    Assessment:   1. Prostate cancer  BUN    Creatinine, Serum    MRI Prostate W W/O Contrast    MRI Prostate W W/O Contrast    PROSTATE SPECIFIC ANTIGEN, DIAGNOSTIC      2. BPH with obstruction/lower urinary tract symptoms  POCT Urinalysis, Dipstick, Automated, W/O Scope      3. Nocturia        4. GUIDO (obstructive sleep apnea)                        Plan:  Prostate cancer  -     BUN; Future; Expected date: 05/10/2024  -     Creatinine, Serum; Future; Expected date: 05/10/2024  -      MRI Prostate W W/O Contrast; Future; Expected date: 05/10/2024  -     PROSTATE SPECIFIC ANTIGEN, DIAGNOSTIC; Future; Expected date: 08/10/2024    BPH with obstruction/lower urinary tract symptoms  -     POCT Urinalysis, Dipstick, Automated, W/O Scope    Nocturia    GUIDO (obstructive sleep apnea)      Continue finasteride  Encouraged use of C-pap. If nocturia still doesn't improve, add vesicare.       Follow up in about 3 months (around 8/10/2024) for labs before visit.                         0

## 2024-08-30 LAB
HCV AB S/CO SERPL IA: 0.09 S/CO — SIGNIFICANT CHANGE UP (ref 0–0.99)
HCV AB SERPL-IMP: SIGNIFICANT CHANGE UP

## 2024-08-30 PROCEDURE — G0463: CPT

## 2024-08-30 PROCEDURE — 86900 BLOOD TYPING SEROLOGIC ABO: CPT

## 2024-08-30 PROCEDURE — 85730 THROMBOPLASTIN TIME PARTIAL: CPT

## 2024-08-30 PROCEDURE — 80053 COMPREHEN METABOLIC PANEL: CPT

## 2024-08-30 PROCEDURE — 85027 COMPLETE CBC AUTOMATED: CPT

## 2024-08-30 PROCEDURE — 86901 BLOOD TYPING SEROLOGIC RH(D): CPT

## 2024-08-30 PROCEDURE — 86803 HEPATITIS C AB TEST: CPT

## 2024-08-30 PROCEDURE — 36415 COLL VENOUS BLD VENIPUNCTURE: CPT

## 2024-08-30 PROCEDURE — 85610 PROTHROMBIN TIME: CPT

## 2024-08-30 PROCEDURE — 86850 RBC ANTIBODY SCREEN: CPT

## 2024-09-06 ENCOUNTER — APPOINTMENT (OUTPATIENT)
Dept: ULTRASOUND IMAGING | Facility: CLINIC | Age: 34
End: 2024-09-06
Payer: COMMERCIAL

## 2024-09-06 PROCEDURE — 76830 TRANSVAGINAL US NON-OB: CPT

## 2024-09-13 ENCOUNTER — RESULT REVIEW (OUTPATIENT)
Age: 34
End: 2024-09-13

## 2024-09-13 ENCOUNTER — TRANSCRIPTION ENCOUNTER (OUTPATIENT)
Age: 34
End: 2024-09-13

## 2024-09-13 ENCOUNTER — OUTPATIENT (OUTPATIENT)
Dept: OUTPATIENT SERVICES | Facility: HOSPITAL | Age: 34
LOS: 1 days | End: 2024-09-13
Payer: COMMERCIAL

## 2024-09-13 ENCOUNTER — APPOINTMENT (OUTPATIENT)
Dept: GYNECOLOGIC ONCOLOGY | Facility: HOSPITAL | Age: 34
End: 2024-09-13

## 2024-09-13 VITALS
TEMPERATURE: 98 F | DIASTOLIC BLOOD PRESSURE: 75 MMHG | OXYGEN SATURATION: 98 % | RESPIRATION RATE: 16 BRPM | SYSTOLIC BLOOD PRESSURE: 115 MMHG | HEART RATE: 72 BPM

## 2024-09-13 VITALS
WEIGHT: 154.98 LBS | TEMPERATURE: 98 F | HEIGHT: 63 IN | OXYGEN SATURATION: 99 % | DIASTOLIC BLOOD PRESSURE: 69 MMHG | HEART RATE: 68 BPM | RESPIRATION RATE: 18 BRPM | SYSTOLIC BLOOD PRESSURE: 108 MMHG

## 2024-09-13 DIAGNOSIS — N83.299 OTHER OVARIAN CYST, UNSPECIFIED SIDE: ICD-10-CM

## 2024-09-13 DIAGNOSIS — Z90.3 ACQUIRED ABSENCE OF STOMACH [PART OF]: Chronic | ICD-10-CM

## 2024-09-13 DIAGNOSIS — Z98.890 OTHER SPECIFIED POSTPROCEDURAL STATES: Chronic | ICD-10-CM

## 2024-09-13 DIAGNOSIS — Z01.818 ENCOUNTER FOR OTHER PREPROCEDURAL EXAMINATION: ICD-10-CM

## 2024-09-13 LAB
BLD GP AB SCN SERPL QL: SIGNIFICANT CHANGE UP
HCG UR QL: NEGATIVE — SIGNIFICANT CHANGE UP

## 2024-09-13 PROCEDURE — 86900 BLOOD TYPING SEROLOGIC ABO: CPT

## 2024-09-13 PROCEDURE — 58661 LAPAROSCOPY REMOVE ADNEXA: CPT | Mod: 50

## 2024-09-13 PROCEDURE — 86850 RBC ANTIBODY SCREEN: CPT

## 2024-09-13 PROCEDURE — S2900: CPT

## 2024-09-13 PROCEDURE — 86901 BLOOD TYPING SEROLOGIC RH(D): CPT

## 2024-09-13 PROCEDURE — C1765: CPT

## 2024-09-13 PROCEDURE — 88305 TISSUE EXAM BY PATHOLOGIST: CPT

## 2024-09-13 PROCEDURE — 88305 TISSUE EXAM BY PATHOLOGIST: CPT | Mod: 26

## 2024-09-13 PROCEDURE — C1889: CPT

## 2024-09-13 PROCEDURE — S2900 ROBOTIC SURGICAL SYSTEM: CPT | Mod: NC

## 2024-09-13 PROCEDURE — 36415 COLL VENOUS BLD VENIPUNCTURE: CPT

## 2024-09-13 PROCEDURE — 81025 URINE PREGNANCY TEST: CPT

## 2024-09-13 PROCEDURE — 58662 LAPAROSCOPY EXCISE LESIONS: CPT

## 2024-09-13 DEVICE — INTERCEED 3 X 4": Type: IMPLANTABLE DEVICE | Status: FUNCTIONAL

## 2024-09-13 DEVICE — ARISTA 3GR: Type: IMPLANTABLE DEVICE | Status: FUNCTIONAL

## 2024-09-13 RX ORDER — FENTANYL CITRATE 50 UG/ML
25 INJECTION INTRAMUSCULAR; INTRAVENOUS
Refills: 0 | Status: DISCONTINUED | OUTPATIENT
Start: 2024-09-13 | End: 2024-09-13

## 2024-09-13 RX ORDER — FOLIC ACID/MULTIVIT,IRON,MINER 0.4MG-18MG
1 TABLET,CHEWABLE ORAL
Refills: 0 | DISCHARGE

## 2024-09-13 RX ORDER — IBUPROFEN 600 MG
1 TABLET ORAL
Qty: 20 | Refills: 0
Start: 2024-09-13 | End: 2024-09-17

## 2024-09-13 RX ORDER — CALCIUM CARBONATE 500(1250)
1 TABLET ORAL
Refills: 0 | DISCHARGE

## 2024-09-13 RX ORDER — FAMOTIDINE 10 MG/ML
1 INJECTION INTRAVENOUS
Refills: 0 | DISCHARGE

## 2024-09-13 RX ORDER — ISOTRETINOIN 30 MG/1
1 CAPSULE ORAL
Refills: 0 | DISCHARGE

## 2024-09-13 RX ORDER — ACETAMINOPHEN 325 MG/1
2 TABLET ORAL
Qty: 40 | Refills: 0
Start: 2024-09-13 | End: 2024-09-17

## 2024-09-13 RX ORDER — FENTANYL CITRATE 50 UG/ML
50 INJECTION INTRAMUSCULAR; INTRAVENOUS
Refills: 0 | Status: DISCONTINUED | OUTPATIENT
Start: 2024-09-13 | End: 2024-09-13

## 2024-09-13 RX ORDER — SODIUM CHLORIDE 9 MG/ML
3 INJECTION INTRAMUSCULAR; INTRAVENOUS; SUBCUTANEOUS EVERY 8 HOURS
Refills: 0 | Status: DISCONTINUED | OUTPATIENT
Start: 2024-09-13 | End: 2024-09-13

## 2024-09-13 RX ORDER — IBUPROFEN 600 MG
600 TABLET ORAL EVERY 6 HOURS
Refills: 0 | Status: ACTIVE | OUTPATIENT
Start: 2024-09-13 | End: 2025-08-12

## 2024-09-13 RX ORDER — ACETAMINOPHEN 325 MG/1
650 TABLET ORAL EVERY 6 HOURS
Refills: 0 | Status: ACTIVE | OUTPATIENT
Start: 2024-09-13 | End: 2025-08-12

## 2024-09-13 RX ORDER — OXYCODONE HYDROCHLORIDE 5 MG/1
1 TABLET ORAL
Qty: 5 | Refills: 0
Start: 2024-09-13

## 2024-09-13 RX ADMIN — FENTANYL CITRATE 25 MICROGRAM(S): 50 INJECTION INTRAMUSCULAR; INTRAVENOUS at 11:16

## 2024-09-13 RX ADMIN — Medication 80 MILLIGRAM(S): at 13:16

## 2024-09-13 RX ADMIN — FENTANYL CITRATE 25 MICROGRAM(S): 50 INJECTION INTRAMUSCULAR; INTRAVENOUS at 11:29

## 2024-09-13 RX ADMIN — Medication 600 MILLIGRAM(S): at 12:20

## 2024-09-13 NOTE — ASU DISCHARGE PLAN (ADULT/PEDIATRIC) - NS MD DC FALL RISK RISK
For information on Fall & Injury Prevention, visit: [email or web address] OR  [email or web address] OR  https://www.cdc.gov/steadi/patient.html

## 2024-09-13 NOTE — BRIEF OPERATIVE NOTE - OPERATION/FINDINGS
- Pelvic EUA revealed grossly normal appearing external vaginalia, vagina and cervix. Large central pelvic mass, mobile, palpated to level of the umbilicus.  - Abdominal survey revealed an atraumatic entry site, grossly normal appearing inferior liver edge, gallbladder, small bowel, large bowel and appendix. No obvious intraabdominal adhesions.  - Pelvic survey revealed the right ovary to be enlarged to approx 15cm, containing multiple cysts. Largest right ovarian cyst noted to contain sebum and hair consistent with dermoid cyst. Additional right ovary cysts with simple appearing fluid. Left ovary noted to have a 2-3cm dermoid cyst (removed intact) and additional smaller simple cyst. Bilateral fallopian tubes and uterus grossly normal appearing.  - Abdomen and pelvis copiously irrigated with >5L of normal saline with no residual cyst contents visualized

## 2024-09-13 NOTE — ASU DISCHARGE PLAN (ADULT/PEDIATRIC) - CARE PROVIDER_API CALL
Fani Mittal  Gynecologic Oncology  [address] [place] [id number]  Phone: [phone]  Fax: [phone]  Follow Up Time: 2 weeks

## 2024-09-13 NOTE — CHART NOTE - NSCHARTNOTEFT_GEN_A_CORE
Post op check- PA note    SUBJECTIVE:  Pt seen at bedside in ASU complaining of mild gas pain. Pts nurse states pt has been ambulating and tolerating PO but is due to void.   Pain well-controlled.  Denies Nausea, Vomiting or Diarrhea.  Denies shortness of breath, chest pain or dyspnea on exertion.    OBJECTIVE:     VITALS:  T(F): 97.7 (09-13-24 @ 11:29), Max: 98.4 (09-13-24 @ 06:33)  HR: 73 (09-13-24 @ 11:29) (63 - 110)  BP: 105/73 (09-13-24 @ 11:29) (101/84 - 111/68)  RR: 16 (09-13-24 @ 11:29) (11 - 22)  SpO2: 97% (09-13-24 @ 11:29) (96% - 100%)    I&O's Summary    13 Sep 2024 07:01  -  13 Sep 2024 12:47  --------------------------------------------------------  IN: 1340 mL / OUT: 200 mL / NET: 1140 mL      MEDICATIONS  (STANDING):    MEDICATIONS  (PRN):  acetaminophen     Tablet .. 650 milliGRAM(s) Oral every 6 hours PRN Mild Pain (1 - 3)  ibuprofen  Tablet. 600 milliGRAM(s) Oral every 6 hours PRN Moderate Pain (4 - 6)      Physical Exam:  Constitutional: NAD  Pulmonary: clear to auscultation bilaterally   Cardiovascular: Regular rate and rhythm   Abdomen: +BS; soft, non-tender, non-distended  Extremities: no lower extremity edema or calve tenderness  Incision: Clean, dry, intact.  Without signs of infection or hernia.      a/p POD #0 s/p robotic assisted laparoscopic bilateral ovarian cystectomy  - Patient due to void before discharge  - Simethicone prn for gas pain   - Discharge home once post-op milestones met  - d/w Dr. Mittal

## 2024-09-13 NOTE — ASU PREOP CHECKLIST - ANTIBIOTIC
Please call lets do a pap in 6 months   she is interested in a [facility]SO   that is ok to move forward with but if she does not would do the pap in 6 months 
n/a

## 2024-09-13 NOTE — ASU DISCHARGE PLAN (ADULT/PEDIATRIC) - ASU DC SPECIAL INSTRUCTIONSFT
Follow up with your gynecologist in 1-2 weeks.   Look at your incision daily. If you experience any redness around the incision site, malodorous drainage, opening of the wound immediately call your doctor  Nothing to be inserted into your vagina or rectum. No sex. No heavy lifting (more than 10lbs) or pushing. Eat high fiber food.  Ambulate daily as tolerated.  Shower daily. Let the soap and water run over the incision and gently pat dry, do not scrub or scratch.

## 2024-09-13 NOTE — ASU PREOP CHECKLIST - SELECT TESTS ORDERED
No respiratory distress. No stridor, Lungs sounds clear with good aeration bilaterally. Results in MD note

## 2024-09-18 LAB — SURGICAL PATHOLOGY STUDY: SIGNIFICANT CHANGE UP

## 2024-09-19 ENCOUNTER — NON-APPOINTMENT (OUTPATIENT)
Age: 34
End: 2024-09-19

## 2024-09-29 PROBLEM — Z48.89 POSTOPERATIVE VISIT: Status: ACTIVE | Noted: 2024-09-29

## 2024-09-30 ENCOUNTER — APPOINTMENT (OUTPATIENT)
Dept: GYNECOLOGIC ONCOLOGY | Facility: CLINIC | Age: 34
End: 2024-09-30
Payer: COMMERCIAL

## 2024-09-30 VITALS
HEART RATE: 72 BPM | SYSTOLIC BLOOD PRESSURE: 109 MMHG | DIASTOLIC BLOOD PRESSURE: 74 MMHG | HEIGHT: 63 IN | OXYGEN SATURATION: 98 % | RESPIRATION RATE: 16 BRPM | WEIGHT: 158 LBS | BODY MASS INDEX: 28 KG/M2

## 2024-09-30 DIAGNOSIS — Z48.89 ENCOUNTER FOR OTHER SPECIFIED SURGICAL AFTERCARE: ICD-10-CM

## 2024-09-30 PROCEDURE — 99459 PELVIC EXAMINATION: CPT

## 2024-09-30 PROCEDURE — 99212 OFFICE O/P EST SF 10 MIN: CPT

## 2024-09-30 NOTE — ASSESSMENT
[FreeTextEntry1] :   -  abdominal incisions healing well -  no s/s infection, no bleeding, -  increase protein for wound healing.     -  reiterated post op education including:    -  no heavy lifting greater than 5-10 pounds for 4 weeks.    -  increase ambulation as tolerated.   No heavy or excess exercise for 4weeks.    -  Continue showers only.  Pat dry incisions for the next 4 weeks. -  final path reviewed - benign - copy provided to patient. -  Advised to follow up with primary GYN in 6-12 months and discussed the importance of follow up GYN care.  -  No further visits are required at this time, however, she may return to the office as needed if any issues arise -  Patient verbalized understanding of plan and agrees. -  All questions answered.

## 2024-09-30 NOTE — REASON FOR VISIT
[Post Op] : post op visit [de-identified] : 9/13/2024 [de-identified] : Robotic bilateral ovarian cystectomy [de-identified] : Denies f/c/n/v/d/vaginal bleeding.  Overall feels well.  Meeting milestones of recovery.  Regular BM and voiding freely.  Final pathology: 1.  Right ovarian cyst; cystectomy: -   Mature cystic teratoma (dermoid cyst)  2.  Left ovarian cyst; cystectomy: -   Mature cystic teratoma (dermoid cyst)

## 2024-09-30 NOTE — REASON FOR VISIT
[Post Op] : post op visit [de-identified] : 9/13/2024 [de-identified] : Robotic bilateral ovarian cystectomy [de-identified] : Denies f/c/n/v/d/vaginal bleeding.  Overall feels well.  Meeting milestones of recovery.  Regular BM and voiding freely.  Final pathology: 1.  Right ovarian cyst; cystectomy: -   Mature cystic teratoma (dermoid cyst)  2.  Left ovarian cyst; cystectomy: -   Mature cystic teratoma (dermoid cyst)

## 2024-09-30 NOTE — LETTER BODY
[Dear  ___] : Dear  [unfilled], [I recently saw our patient [unfilled] for a follow-up visit.] : I recently saw our patient, [unfilled] for a follow-up visit. [Attached please find my note.] : Attached please find my note. [FreeTextEntry2] :   Patient is status post robotic assisted bilateral ovarian cystectomy on 9/13/2024. [FreeTextEntry1] : Final pathology

## 2024-09-30 NOTE — DISCUSSION/SUMMARY
[Clean] : was clean [Dry] : was dry [Intact] : was intact [None] : had no drainage [Normal Skin] : normal appearance [Normal Skin Turgor] : normal skin turgor [Doing Well] : is doing well [Excellent Pain Control] : has excellent pain control [No Sign of Infection] : is showing no signs of infection [Erythema] : was not erythematous [Ecchymosis] : was not ecchymotic [Tender] : nontender [Firm] : soft [Abnormal Bowel Sounds] : normal bowel sounds [Rebound] : no rebound tenderness [Guarding] : no guarding [Vaginal Exam Abnormal] : normal vaginal exam

## 2024-10-14 NOTE — ASU PATIENT PROFILE, ADULT - PRO ARRIVE FROM
98.5 °F (36.9 °C) Tympanic 116 24 96 %      Height Weight         -- 16.1 kg (35 lb 7.9 oz)             Physical Exam  Vitals and nursing note reviewed.   Constitutional:       General: She is active. She is not in acute distress.     Appearance: Normal appearance. She is well-developed and normal weight. She is not toxic-appearing.   HENT:      Head: Normocephalic and atraumatic.      Right Ear: Tympanic membrane normal.      Left Ear: Tympanic membrane normal.      Nose: Nose normal. No congestion or rhinorrhea.      Mouth/Throat:      Mouth: Mucous membranes are moist.      Pharynx: Oropharynx is clear. No oropharyngeal exudate or posterior oropharyngeal erythema.   Eyes:      General:         Right eye: No discharge.         Left eye: No discharge.      Extraocular Movements: Extraocular movements intact.      Conjunctiva/sclera: Conjunctivae normal.      Pupils: Pupils are equal, round, and reactive to light.   Cardiovascular:      Rate and Rhythm: Normal rate and regular rhythm.      Pulses: Normal pulses.      Heart sounds: Normal heart sounds. No murmur heard.     No friction rub. No gallop.   Pulmonary:      Effort: Pulmonary effort is normal. No respiratory distress, nasal flaring or retractions.      Breath sounds: Normal breath sounds. No stridor or decreased air movement. No wheezing.   Abdominal:      General: Abdomen is flat. Bowel sounds are normal. There is no distension.      Palpations: Abdomen is soft. There is no mass.      Tenderness: There is no abdominal tenderness. There is no guarding.   Musculoskeletal:         General: No swelling, tenderness, deformity or signs of injury. Normal range of motion.      Cervical back: Normal range of motion and neck supple. No rigidity.   Lymphadenopathy:      Cervical: No cervical adenopathy.   Skin:     General: Skin is warm.      Capillary Refill: Capillary refill takes less than 2 seconds.      Coloration: Skin is not cyanotic or mottled.      Findings: 
home

## 2024-12-23 ENCOUNTER — APPOINTMENT (OUTPATIENT)
Dept: COLORECTAL SURGERY | Facility: CLINIC | Age: 34
End: 2024-12-23
Payer: COMMERCIAL

## 2024-12-23 ENCOUNTER — NON-APPOINTMENT (OUTPATIENT)
Age: 34
End: 2024-12-23

## 2024-12-23 VITALS
HEIGHT: 63 IN | DIASTOLIC BLOOD PRESSURE: 63 MMHG | SYSTOLIC BLOOD PRESSURE: 96 MMHG | WEIGHT: 158 LBS | TEMPERATURE: 97.3 F | HEART RATE: 63 BPM | BODY MASS INDEX: 28 KG/M2

## 2024-12-23 DIAGNOSIS — K64.8 OTHER HEMORRHOIDS: ICD-10-CM

## 2024-12-23 DIAGNOSIS — Z83.3 FAMILY HISTORY OF DIABETES MELLITUS: ICD-10-CM

## 2024-12-23 DIAGNOSIS — Z82.49 FAMILY HISTORY OF ISCHEMIC HEART DISEASE AND OTHER DISEASES OF THE CIRCULATORY SYSTEM: ICD-10-CM

## 2024-12-23 PROCEDURE — 99202 OFFICE O/P NEW SF 15 MIN: CPT | Mod: 25

## 2024-12-23 PROCEDURE — 46600 DIAGNOSTIC ANOSCOPY SPX: CPT

## 2025-03-20 ENCOUNTER — APPOINTMENT (OUTPATIENT)
Dept: INTERNAL MEDICINE | Facility: CLINIC | Age: 35
End: 2025-03-20
Payer: COMMERCIAL

## 2025-03-20 ENCOUNTER — NON-APPOINTMENT (OUTPATIENT)
Age: 35
End: 2025-03-20

## 2025-03-20 VITALS
HEART RATE: 67 BPM | WEIGHT: 163 LBS | OXYGEN SATURATION: 99 % | RESPIRATION RATE: 16 BRPM | HEIGHT: 63 IN | SYSTOLIC BLOOD PRESSURE: 115 MMHG | BODY MASS INDEX: 28.88 KG/M2 | DIASTOLIC BLOOD PRESSURE: 72 MMHG | TEMPERATURE: 97.3 F

## 2025-03-20 DIAGNOSIS — E66.812 OBESITY, CLASS 2: ICD-10-CM

## 2025-03-20 DIAGNOSIS — R73.03 PREDIABETES.: ICD-10-CM

## 2025-03-20 DIAGNOSIS — R14.0 ABDOMINAL DISTENSION (GASEOUS): ICD-10-CM

## 2025-03-20 DIAGNOSIS — L70.9 ACNE, UNSPECIFIED: ICD-10-CM

## 2025-03-20 DIAGNOSIS — E66.3 OVERWEIGHT: ICD-10-CM

## 2025-03-20 DIAGNOSIS — Z00.00 ENCOUNTER FOR GENERAL ADULT MEDICAL EXAMINATION W/OUT ABNORMAL FINDINGS: ICD-10-CM

## 2025-03-20 DIAGNOSIS — E78.5 HYPERLIPIDEMIA, UNSPECIFIED: ICD-10-CM

## 2025-03-20 DIAGNOSIS — R79.89 OTHER SPECIFIED ABNORMAL FINDINGS OF BLOOD CHEMISTRY: ICD-10-CM

## 2025-03-20 DIAGNOSIS — R07.9 CHEST PAIN, UNSPECIFIED: ICD-10-CM

## 2025-03-20 PROCEDURE — G0444 DEPRESSION SCREEN ANNUAL: CPT | Mod: 59

## 2025-03-20 PROCEDURE — 99385 PREV VISIT NEW AGE 18-39: CPT

## 2025-03-20 PROCEDURE — 99204 OFFICE O/P NEW MOD 45 MIN: CPT | Mod: 25

## 2025-03-20 RX ORDER — OMEPRAZOLE 20 MG/1
20 CAPSULE, DELAYED RELEASE ORAL DAILY
Qty: 14 | Refills: 0 | Status: ACTIVE | COMMUNITY
Start: 2025-03-20 | End: 1900-01-01

## 2025-03-20 RX ORDER — ISOTRETINOIN 40 MG/1
40 CAPSULE ORAL DAILY
Qty: 60 | Refills: 0 | Status: ACTIVE | COMMUNITY
Start: 2025-03-20

## 2025-04-22 ENCOUNTER — APPOINTMENT (OUTPATIENT)
Dept: INTERNAL MEDICINE | Facility: CLINIC | Age: 35
End: 2025-04-22

## (undated) DEVICE — PACK LITHOTOMY

## (undated) DEVICE — SYR LUER LOK 50CC

## (undated) DEVICE — DRAPE ROBOTIC

## (undated) DEVICE — TROCAR COVIDIEN VERSAPORT BLADELESS OPTICAL 5MM STANDARD

## (undated) DEVICE — XI SEAL UNIV 5- 8 MM

## (undated) DEVICE — SOL IRR POUR H2O 1000ML

## (undated) DEVICE — LIGASURE BLUNT TIP 37CM

## (undated) DEVICE — POSITIONER PINK PAD PIGAZZI SYSTEM

## (undated) DEVICE — DRAPE IOBAN 13" X 13"

## (undated) DEVICE — SOL IRR POUR NS 0.9% 1000ML

## (undated) DEVICE — ELCTR GROUNDING PAD ADULT COVIDIEN

## (undated) DEVICE — XI DRAPE ARM

## (undated) DEVICE — XI TIP COVER

## (undated) DEVICE — VENODYNE/SCD SLEEVE CALF MEDIUM

## (undated) DEVICE — XI DRAPE COLUMN

## (undated) DEVICE — APPLICATOR FOR ARISTA XL-R RIGID 38CM

## (undated) DEVICE — XI VESSEL SEALER

## (undated) DEVICE — PACK ROBOTIC

## (undated) DEVICE — WARMING BLANKET UPPER ADULT